# Patient Record
Sex: FEMALE | Race: WHITE | NOT HISPANIC OR LATINO | Employment: FULL TIME | URBAN - METROPOLITAN AREA
[De-identification: names, ages, dates, MRNs, and addresses within clinical notes are randomized per-mention and may not be internally consistent; named-entity substitution may affect disease eponyms.]

---

## 2017-01-18 ENCOUNTER — TRANSCRIBE ORDERS (OUTPATIENT)
Dept: ADMINISTRATIVE | Facility: HOSPITAL | Age: 62
End: 2017-01-18

## 2017-01-18 DIAGNOSIS — Z12.31 VISIT FOR SCREENING MAMMOGRAM: Primary | ICD-10-CM

## 2017-02-23 ENCOUNTER — HOSPITAL ENCOUNTER (OUTPATIENT)
Dept: MAMMOGRAPHY | Facility: HOSPITAL | Age: 62
Discharge: HOME | End: 2017-02-23
Admitting: INTERNAL MEDICINE

## 2017-02-23 DIAGNOSIS — Z12.31 VISIT FOR SCREENING MAMMOGRAM: ICD-10-CM

## 2017-02-23 PROCEDURE — 77063 BREAST TOMOSYNTHESIS BI: CPT

## 2017-02-23 PROCEDURE — G0202 SCR MAMMO BI INCL CAD: HCPCS

## 2017-02-23 PROCEDURE — 77067 SCR MAMMO BI INCL CAD: CPT | Performed by: RADIOLOGY

## 2017-02-23 PROCEDURE — 77063 BREAST TOMOSYNTHESIS BI: CPT | Performed by: RADIOLOGY

## 2017-09-05 ENCOUNTER — HOSPITAL ENCOUNTER (EMERGENCY)
Facility: HOSPITAL | Age: 62
Discharge: HOME | End: 2017-09-05
Attending: EMERGENCY MEDICINE | Admitting: EMERGENCY MEDICINE

## 2017-09-05 ENCOUNTER — APPOINTMENT (OUTPATIENT)
Dept: CT IMAGING | Facility: HOSPITAL | Age: 62
End: 2017-09-05

## 2017-09-05 VITALS
HEIGHT: 64 IN | DIASTOLIC BLOOD PRESSURE: 73 MMHG | RESPIRATION RATE: 18 BRPM | HEART RATE: 80 BPM | OXYGEN SATURATION: 100 % | WEIGHT: 140 LBS | BODY MASS INDEX: 23.9 KG/M2 | SYSTOLIC BLOOD PRESSURE: 139 MMHG | TEMPERATURE: 99.1 F

## 2017-09-05 DIAGNOSIS — E16.2 HYPOGLYCEMIA: ICD-10-CM

## 2017-09-05 DIAGNOSIS — N23 RENAL COLIC: ICD-10-CM

## 2017-09-05 DIAGNOSIS — N20.1 URETEROLITHIASIS: Primary | ICD-10-CM

## 2017-09-05 DIAGNOSIS — I45.10 RIGHT BUNDLE BRANCH BLOCK: ICD-10-CM

## 2017-09-05 DIAGNOSIS — D72.829 LEUKOCYTOSIS, UNSPECIFIED TYPE: ICD-10-CM

## 2017-09-05 DIAGNOSIS — R10.9 FLANK PAIN: ICD-10-CM

## 2017-09-05 LAB
ALBUMIN SERPL-MCNC: 4.1 G/DL (ref 3.2–4.8)
ALBUMIN/GLOB SERPL: 1.3 G/DL (ref 1.5–2.5)
ALP SERPL-CCNC: 93 U/L (ref 25–100)
ALT SERPL W P-5'-P-CCNC: 18 U/L (ref 7–40)
ANION GAP SERPL CALCULATED.3IONS-SCNC: 9 MMOL/L (ref 3–11)
AST SERPL-CCNC: 16 U/L (ref 0–33)
BACTERIA UR QL AUTO: ABNORMAL /HPF
BASOPHILS # BLD AUTO: 0.03 10*3/MM3 (ref 0–0.2)
BASOPHILS NFR BLD AUTO: 0.2 % (ref 0–1)
BILIRUB SERPL-MCNC: 0.4 MG/DL (ref 0.3–1.2)
BILIRUB UR QL STRIP: NEGATIVE
BUN BLD-MCNC: 16 MG/DL (ref 9–23)
BUN/CREAT SERPL: 14.5 (ref 7–25)
CALCIUM SPEC-SCNC: 9.3 MG/DL (ref 8.7–10.4)
CHLORIDE SERPL-SCNC: 104 MMOL/L (ref 99–109)
CLARITY UR: ABNORMAL
CO2 SERPL-SCNC: 26 MMOL/L (ref 20–31)
COLOR UR: YELLOW
CREAT BLD-MCNC: 1.1 MG/DL (ref 0.6–1.3)
D-LACTATE SERPL-SCNC: 2 MMOL/L (ref 0.5–2)
DEPRECATED RDW RBC AUTO: 44.3 FL (ref 37–54)
EOSINOPHIL # BLD AUTO: 0.03 10*3/MM3 (ref 0–0.3)
EOSINOPHIL NFR BLD AUTO: 0.2 % (ref 0–3)
ERYTHROCYTE [DISTWIDTH] IN BLOOD BY AUTOMATED COUNT: 13.4 % (ref 11.3–14.5)
GFR SERPL CREATININE-BSD FRML MDRD: 50 ML/MIN/1.73
GLOBULIN UR ELPH-MCNC: 3.2 GM/DL
GLUCOSE BLD-MCNC: 134 MG/DL (ref 70–100)
GLUCOSE UR STRIP-MCNC: NEGATIVE MG/DL
HCT VFR BLD AUTO: 40.4 % (ref 34.5–44)
HGB BLD-MCNC: 13.2 G/DL (ref 11.5–15.5)
HGB UR QL STRIP.AUTO: ABNORMAL
HOLD SPECIMEN: NORMAL
HOLD SPECIMEN: NORMAL
HYALINE CASTS UR QL AUTO: ABNORMAL /LPF
IMM GRANULOCYTES # BLD: 0.03 10*3/MM3 (ref 0–0.03)
IMM GRANULOCYTES NFR BLD: 0.2 % (ref 0–0.6)
INR PPP: 0.94
KETONES UR QL STRIP: NEGATIVE
LEUKOCYTE ESTERASE UR QL STRIP.AUTO: ABNORMAL
LIPASE SERPL-CCNC: 57 U/L (ref 6–51)
LYMPHOCYTES # BLD AUTO: 1.55 10*3/MM3 (ref 0.6–4.8)
LYMPHOCYTES NFR BLD AUTO: 12.8 % (ref 24–44)
MCH RBC QN AUTO: 29.5 PG (ref 27–31)
MCHC RBC AUTO-ENTMCNC: 32.7 G/DL (ref 32–36)
MCV RBC AUTO: 90.4 FL (ref 80–99)
MONOCYTES # BLD AUTO: 0.53 10*3/MM3 (ref 0–1)
MONOCYTES NFR BLD AUTO: 4.4 % (ref 0–12)
NEUTROPHILS # BLD AUTO: 9.96 10*3/MM3 (ref 1.5–8.3)
NEUTROPHILS NFR BLD AUTO: 82.2 % (ref 41–71)
NITRITE UR QL STRIP: NEGATIVE
PH UR STRIP.AUTO: <=5 [PH] (ref 5–8)
PLATELET # BLD AUTO: 260 10*3/MM3 (ref 150–450)
PMV BLD AUTO: 8.2 FL (ref 6–12)
POTASSIUM BLD-SCNC: 3.8 MMOL/L (ref 3.5–5.5)
PROT SERPL-MCNC: 7.3 G/DL (ref 5.7–8.2)
PROT UR QL STRIP: ABNORMAL
PROTHROMBIN TIME: 10.2 SECONDS (ref 9.6–11.5)
RBC # BLD AUTO: 4.47 10*6/MM3 (ref 3.89–5.14)
RBC # UR: ABNORMAL /HPF
REF LAB TEST METHOD: ABNORMAL
SODIUM BLD-SCNC: 139 MMOL/L (ref 132–146)
SP GR UR STRIP: 1.02 (ref 1–1.03)
SQUAMOUS #/AREA URNS HPF: ABNORMAL /HPF
TROPONIN I SERPL-MCNC: 0 NG/ML (ref 0–0.07)
UROBILINOGEN UR QL STRIP: ABNORMAL
WBC NRBC COR # BLD: 12.13 10*3/MM3 (ref 3.5–10.8)
WBC UR QL AUTO: ABNORMAL /HPF
WHOLE BLOOD HOLD SPECIMEN: NORMAL
WHOLE BLOOD HOLD SPECIMEN: NORMAL
YEAST URNS QL MICRO: ABNORMAL /HPF

## 2017-09-05 PROCEDURE — 96361 HYDRATE IV INFUSION ADD-ON: CPT

## 2017-09-05 PROCEDURE — 83605 ASSAY OF LACTIC ACID: CPT | Performed by: EMERGENCY MEDICINE

## 2017-09-05 PROCEDURE — 84484 ASSAY OF TROPONIN QUANT: CPT

## 2017-09-05 PROCEDURE — 25010000002 KETOROLAC TROMETHAMINE PER 15 MG: Performed by: EMERGENCY MEDICINE

## 2017-09-05 PROCEDURE — 85610 PROTHROMBIN TIME: CPT | Performed by: EMERGENCY MEDICINE

## 2017-09-05 PROCEDURE — 96376 TX/PRO/DX INJ SAME DRUG ADON: CPT

## 2017-09-05 PROCEDURE — 81001 URINALYSIS AUTO W/SCOPE: CPT | Performed by: EMERGENCY MEDICINE

## 2017-09-05 PROCEDURE — 83690 ASSAY OF LIPASE: CPT | Performed by: EMERGENCY MEDICINE

## 2017-09-05 PROCEDURE — 80053 COMPREHEN METABOLIC PANEL: CPT | Performed by: EMERGENCY MEDICINE

## 2017-09-05 PROCEDURE — 74176 CT ABD & PELVIS W/O CONTRAST: CPT

## 2017-09-05 PROCEDURE — 96365 THER/PROPH/DIAG IV INF INIT: CPT

## 2017-09-05 PROCEDURE — 25010000002 CEFTRIAXONE PER 250 MG: Performed by: EMERGENCY MEDICINE

## 2017-09-05 PROCEDURE — 96375 TX/PRO/DX INJ NEW DRUG ADDON: CPT

## 2017-09-05 PROCEDURE — 93005 ELECTROCARDIOGRAM TRACING: CPT | Performed by: EMERGENCY MEDICINE

## 2017-09-05 PROCEDURE — 99284 EMERGENCY DEPT VISIT MOD MDM: CPT

## 2017-09-05 PROCEDURE — 85025 COMPLETE CBC W/AUTO DIFF WBC: CPT | Performed by: EMERGENCY MEDICINE

## 2017-09-05 PROCEDURE — 87086 URINE CULTURE/COLONY COUNT: CPT | Performed by: EMERGENCY MEDICINE

## 2017-09-05 RX ORDER — SODIUM CHLORIDE 0.9 % (FLUSH) 0.9 %
10 SYRINGE (ML) INJECTION AS NEEDED
Status: DISCONTINUED | OUTPATIENT
Start: 2017-09-05 | End: 2017-09-05 | Stop reason: HOSPADM

## 2017-09-05 RX ORDER — HYDROMORPHONE HYDROCHLORIDE 1 MG/ML
0.5 INJECTION, SOLUTION INTRAMUSCULAR; INTRAVENOUS; SUBCUTANEOUS ONCE
Status: DISCONTINUED | OUTPATIENT
Start: 2017-09-05 | End: 2017-09-05 | Stop reason: HOSPADM

## 2017-09-05 RX ORDER — KETOROLAC TROMETHAMINE 15 MG/ML
10 INJECTION, SOLUTION INTRAMUSCULAR; INTRAVENOUS ONCE
Status: COMPLETED | OUTPATIENT
Start: 2017-09-05 | End: 2017-09-05

## 2017-09-05 RX ORDER — SODIUM CHLORIDE 9 MG/ML
500 INJECTION, SOLUTION INTRAVENOUS ONCE
Status: COMPLETED | OUTPATIENT
Start: 2017-09-05 | End: 2017-09-05

## 2017-09-05 RX ORDER — ONDANSETRON 8 MG/1
8 TABLET, ORALLY DISINTEGRATING ORAL EVERY 8 HOURS PRN
Qty: 10 TABLET | Refills: 0 | Status: SHIPPED | OUTPATIENT
Start: 2017-09-05 | End: 2017-09-29

## 2017-09-05 RX ORDER — TAMSULOSIN HYDROCHLORIDE 0.4 MG/1
1 CAPSULE ORAL NIGHTLY
Qty: 15 CAPSULE | Refills: 0 | Status: SHIPPED | OUTPATIENT
Start: 2017-09-05 | End: 2017-10-26

## 2017-09-05 RX ORDER — KETOROLAC TROMETHAMINE 15 MG/ML
5 INJECTION, SOLUTION INTRAMUSCULAR; INTRAVENOUS ONCE
Status: COMPLETED | OUTPATIENT
Start: 2017-09-05 | End: 2017-09-05

## 2017-09-05 RX ORDER — SODIUM CHLORIDE 9 MG/ML
125 INJECTION, SOLUTION INTRAVENOUS CONTINUOUS
Status: DISCONTINUED | OUTPATIENT
Start: 2017-09-05 | End: 2017-09-05 | Stop reason: HOSPADM

## 2017-09-05 RX ORDER — ONDANSETRON 2 MG/ML
4 INJECTION INTRAMUSCULAR; INTRAVENOUS ONCE
Status: DISCONTINUED | OUTPATIENT
Start: 2017-09-05 | End: 2017-09-05 | Stop reason: HOSPADM

## 2017-09-05 RX ORDER — CEFDINIR 300 MG/1
300 CAPSULE ORAL 2 TIMES DAILY
Qty: 20 CAPSULE | Refills: 0 | Status: SHIPPED | OUTPATIENT
Start: 2017-09-05 | End: 2017-09-29

## 2017-09-05 RX ORDER — OXYCODONE HYDROCHLORIDE AND ACETAMINOPHEN 5; 325 MG/1; MG/1
1-2 TABLET ORAL EVERY 6 HOURS PRN
Qty: 24 TABLET | Refills: 0 | Status: SHIPPED | OUTPATIENT
Start: 2017-09-05 | End: 2017-09-29

## 2017-09-05 RX ORDER — CEFTRIAXONE SODIUM 1 G/50ML
1 INJECTION, SOLUTION INTRAVENOUS ONCE
Status: COMPLETED | OUTPATIENT
Start: 2017-09-05 | End: 2017-09-05

## 2017-09-05 RX ADMIN — SODIUM CHLORIDE 125 ML/HR: 9 INJECTION, SOLUTION INTRAVENOUS at 08:27

## 2017-09-05 RX ADMIN — SODIUM CHLORIDE 500 ML: 9 INJECTION, SOLUTION INTRAVENOUS at 06:48

## 2017-09-05 RX ADMIN — CEFTRIAXONE SODIUM 1 G: 1 INJECTION, SOLUTION INTRAVENOUS at 08:30

## 2017-09-05 RX ADMIN — KETOROLAC TROMETHAMINE 5 MG: 15 INJECTION, SOLUTION INTRAMUSCULAR; INTRAVENOUS at 09:48

## 2017-09-05 RX ADMIN — KETOROLAC TROMETHAMINE 10 MG: 15 INJECTION, SOLUTION INTRAMUSCULAR; INTRAVENOUS at 08:17

## 2017-09-05 NOTE — ED PROVIDER NOTES
Subjective   HPI Comments: 62-year-old white female presents to the emergency Department with left flank pain    Patient reports onset of left flank pain 2 days ago.  She's had hematuria more probably during the day and straw colored urine at night associated with left flank pain.  She feels better when she walks.  Today it started radiating around toward the front at 3 AM with increased pain graded 9/10.  She denies any fevers chills nausea vomiting diarrhea.  She has no BRBPR.  She denies any cough congestion or URI symptoms.  She presents to the ED only after acceleration of her pain this morning at 3 AM    She denies any history of kidney stone.  She denies any other chronic medical problems and is on no medications.    Patient is a 62 y.o. female presenting with flank pain.   Flank Pain   Pain location:  L flank  Pain quality: aching    Pain radiates to:  LLQ  Onset quality:  Gradual  Duration:  2 days  Timing:  Constant  Progression:  Waxing and waning  Chronicity:  New  Context: not alcohol use, not laxative use, not medication withdrawal, not previous surgeries, not recent illness, not sick contacts, not suspicious food intake and not trauma    Relieved by:  Nothing  Worsened by:  Nothing  Ineffective treatments:  Position changes and urination  Associated symptoms: hematuria    Associated symptoms: no chest pain, no chills, no constipation, no cough, no diarrhea, no dysuria, no fever, no hematemesis, no hematochezia, no nausea, no shortness of breath, no sore throat and no vomiting    Risk factors: no alcohol abuse, not elderly and not pregnant        Review of Systems   Constitutional: Negative.  Negative for chills and fever.   HENT: Negative.  Negative for rhinorrhea and sore throat.    Eyes: Negative.    Respiratory: Negative.  Negative for cough and shortness of breath.    Cardiovascular: Negative.  Negative for chest pain.   Gastrointestinal: Negative.  Negative for blood in stool, constipation,  diarrhea, hematemesis, hematochezia, nausea and vomiting.   Genitourinary: Positive for flank pain and hematuria. Negative for dysuria, frequency and urgency.   Skin: Negative.  Negative for rash.   Neurological: Negative.    All other systems reviewed and are negative.      History reviewed. No pertinent past medical history.    No Known Allergies    History reviewed. No pertinent surgical history.    History reviewed. No pertinent family history.    Social History     Social History   • Marital status:      Spouse name: N/A   • Number of children: N/A   • Years of education: N/A     Social History Main Topics   • Smoking status: Never Smoker   • Smokeless tobacco: None   • Alcohol use No   • Drug use: No   • Sexual activity: Not Asked     Other Topics Concern   • None     Social History Narrative   • None           Objective   Physical Exam   Constitutional: She is oriented to person, place, and time. She appears well-developed and well-nourished. No distress.   HENT:   Head: Normocephalic and atraumatic.   Nose: Nose normal.   Mouth/Throat: Oropharynx is clear and moist.   Eyes: Conjunctivae are normal.   Neck: Normal range of motion. Neck supple.   Cardiovascular: Normal rate, regular rhythm, normal heart sounds and intact distal pulses.    Pulmonary/Chest: Effort normal and breath sounds normal. No respiratory distress. She has no wheezes. She has no rales. She exhibits no tenderness.   Abdominal: Soft. Bowel sounds are normal. She exhibits no distension and no mass. There is no tenderness. There is no rebound and no guarding.   Musculoskeletal: Normal range of motion. She exhibits no edema.   Lymphadenopathy:     She has no cervical adenopathy.   Neurological: She is alert and oriented to person, place, and time.   Skin: Skin is warm and dry. No rash noted. She is not diaphoretic.   Psychiatric: She has a normal mood and affect. Her behavior is normal.   Nursing note and vitals  reviewed.      Procedures         ED Course  ED Course   Comment By Time   Discussed findings with patient and spouse.  Discussed medications.  Patient has no medication allergies.  I discussed analgesics ordered and available for her she would choose to take.  I've ordered her Zofran. Celio Bonilla MD 09/05 0644   Dr. Bonilla spoke with the pt and discussed all findings. The pt does not have a urologist. Levar Rouse 09/05 0747   I discussed urologic follow-up with patient and spouse.  They would like to follow-up with Dr. Luo. Celio Bonilla MD 09/05 0820   [facility] query complete. Treatment plan to include limited course of prescribed  controlled substance. Risks including addiction, benefits, and alternatives presented to patient. Levar Rouse 09/05 0826   Dr. Bonilla spoke with Dr. Luo. He agrees with the current course of care and will see the pt tomorrow. Levar Rouse 09/05 0838   I discussed discharge instructions at length with this very medically obtained patient and spouse.  I discussed follow-up and indications for immediate return.Provided him a copy of EKG as this appears to be a new right bundle branch block but without any symptoms or history of cardiac disease.Very pleasant and responsible patient and spouse verbalized understanding agreement with the plan of care. Celio Bonilla MD 09/05 0847                  [facility]    Final diagnoses:   Ureterolithiasis   Renal colic   Flank pain   Right bundle branch block - requiring follow up   Leukocytosis, unspecified type - new   Hypoglycemia            Celio Bonilla MD  09/05/17 0393

## 2017-09-05 NOTE — DISCHARGE INSTRUCTIONS
Strain your urine, and if you pass your stone take it to the urologist for evaluation.    Push plenty of fluids.    Take pain medications, as prescribed.    Take antibiotics as ordered.    Follow up with Dr. Luo tomorrow, as discussed.    Follow up with Dr. Cao for follow up on right bundle branch block on EKG. Discuss if a cardiovascular referral is required.    Caution with using the Flomax as it may give you orthostatic hypotension, and cause dizziness with standing.  Sit on the side of the bed after lying down, and immediately lay down or sit down if you become dizzy.  Stop taking the Cipro drops her blood pressure.     If you develop intractable fever or pain or other acute, progressive, emergent or urgent symptoms, as discussed, return to the emergency room for evaluation.    CONTROLLED SUBSTANCE(S) EDUCATION  Controlled Substances have been prescribed by your provider to treat your medical condition and associated symptoms. Although Controlled Substances can be effective in relieving your pain or other symptoms, they may also cause serious adverse effects. It is important that you understand how to safely and appropriately take these medications.  Proper Use  1. Carefully following instructions for use, including timing of doses, whether to take the  medication with or without food, and any foods or other medications to avoid while taking the medication;  2. If you have low or impaired vision you should wear glasses when taking the medication and not take the medication in the dark;  3. You should read the prescription container label each time to confirm the dosage;  4. You should never use the medication after the expiration date;  5. You must never share the medication with others;  6. You must not take the medication with alcohol or other sedatives;  7. You should not take the medication to help you sleep;  8. You should never break, crush or chew the medication;  9. If you have been prescribed a  skin patch (transdermal), external heat, fever and exertion can increase the absorption of these products, leading to potentially fatal overdose;  10. You should immediately contact the physician?s office to report any adverse reaction and,  11. It is illegal to share, sell or give away Controlled Substances.  Driving and Work Safety  1. Controlled Substances may cause sleepiness, clouded thinking, decreased concentration, slower reflexes, or incoordination, all of which may create a danger to you and others when driving or operating certain type of machinery;  2. Avoid, if possible, driving or engaging in other potentially dangerous work or other activities, for a specific period of time until the initial effects of the Controlled Substances no longer create such dangers; and,  3. Ingesting other substances, such as alcohol, benzodiazepines or some cold remedies, at the same time you are taking the Controlled Substances prescribed or dispensed may increase cognitive and motor impairment.  Pregnancy  If you are pregnant or nursing a baby, avoid using Controlled Substances, or use them on a minimal basis in strict accordance with your provider?s instructions.  Potential for Overdose and Response  1. The use of Controlled Substances creates a risk of respiratory depression, which may result in serious harm or death. You and others should be watchful for the following warning signs of overmedication:  ? intoxicated behavior, such as confusion, slurred speech, or stumbling;  ? feeling dizzy or faint;  ? acting very drowsy or groggy;  ? unusual snoring, gasping, or snorting during sleep;  ? and/or difficulty waking up from sleep or difficulty in staying awake.  2. Immediately call ?911? or an emergency service upon you or your caregivers observing or experiencing any of the following conditions:  ? you cannot be aroused or waken, or are unable to talk after being awakened;  ? you have shortness of breath, slow or light  breathing, or stopped breathing;  ? gurgling noises coming from your mouth or throat;  ? your body is limp, seems lifeless;  ? your face is pale or clammy;  ? your fingernails or lips are turning purple or blue; and/or  ? your heartbeat is slow, unusual or stopped  Safe Storage of Controlled Substances  1. If your Controlled Substances are not stored in a safe manner there is a potential that  partners, family members or others may improperly obtain your Controlled Substances;  2. Always keep the Controlled Substances in the original container;  3. Store Controlled Substances in a locked cabinet or other secure storage unit, that is cool, dry and out of direct sunlight, such as:  ? an existing safe;  ? a cut-proof travel bag;  ? a portable lock box designed for travel; or,  ? a locking medical box.  4. Do not store Controlled Substances in:  ? an unlocked medicine cabinet;  ? in your car; or,  ? in a refrigerator or freezer unless specifically recommended by the prescriber or  pharmacist; and  5. Immediately notify your provider if any Controlled Substances prescribed or dispensed by the provider are stolen or improperly taken by another individual.  Proper Disposal  1. It is important to safely and appropriately dispose of unused Controlled Substances that had been prescribed or dispensed by your provider;  2. Promptly dispose of unused Controlled Substances after the expiration date of the  prescription or after you no longer require the Controlled Substances to treat your medical condition;  3. In order to safely dispose of Controlled Substances, you should turn in the unused Controlled Substances as part of an approved governmental drug take-back program. The [place] Office of Drug Control Policy has a listing of [place] Permanent Drug Disposal Locations at http://www.odcp.[place].gov - click on the [place] Prescriptions Drug Drop Map and Location on the left side of the page.  4. You should not flush Controlled  Substances down the toilet; and,  5. You should personally remove any identifying information, including the prescription number, from an empty Controlled Substance container and then properly dispose of the empty container.  CONSENT FOR TREATMENT WITH CONTROLLED SUBSTANCE(S)  (This is for an initial prescription)  1. Controlled Substances  Controlled Substances are prescribed to treat a variety of conditions, including the relief  of chronic pain, to provide stimulation, promote weight loss, and treat mood disorders.  Pain relief is an important medical reason to take Controlled Substances.  Controlled Substances are drugs or chemical substances whose possession and use are  regulated under the Controlled Substances Act. The law requires that patient are informed of the risks, benefits, and alternatives of taking Controlled Substances.  2. Adverse Effects  As with any medication, there are risks and adverse effects associated with the use of  Controlled Substances. Common adverse effects of pain medicines could include, but are not limited to: sedation or sleepiness, nausea, vomiting, constipation, pruritus (itching), confusion, respiratory depression, and urinary retention. Some of these effects may make it unsafe for you to drive a vehicle, operate heavy machinery, or perform other tasks that require concentration and coordination. Excessive use of these Controlled Substances can lead to profound sedation, respiratory depression, coma, and/or death. Regarding stimulants, adverse effects could include, but are not limited to: drug dependency, neuropsychiatric symptoms such as psychosis and garrick, weight loss, cardiovascular events such as heart attack and stroke, insomnia, hypertension, and agitation. Any questions you have regarding the Controlled Substance(s) should be discussed with the prescribing provider.  3. Physical Dependence, Tolerance, and Addiction  Although uncommon when used for their clinical  indications, both pain relievers and  stimulants can cause physical dependence, tolerance, and/or addiction when used for a  prolonged period. Maintenance therapy with these Controlled Substances can cause  physical dependence. This means that if these medications are abruptly stopped, or  decreased significantly over a short period of time, a patient may experience withdrawal  symptoms such as: nervousness, irritability, insomnia, sweating, abdominal cramping,  nausea, vomiting, and diarrhea. Tolerance occurs when the effects of these Controlled  Substances are decreased over a period of prolonged use making it necessary to increase the dosage. Physical dependence and tolerance are different than addiction. Addiction is a complex disease characterized by compulsive craving or seeking and use of a substance despite its extreme negatives on a person. The risk of addiction may be increased in a patient with a history of alcoholism or other addiction.  4. Alternatives  Controlled Substances are routinely prescribed to treat moderate to severe pain or other  medical conditions. Other medicines are available to treat these conditions that are not  associated with tolerance or addiction, however, are associated with a lower level of pain  relief or stimulation. It may also be an alternative to not take any medicine to treat these  conditions, or to use alternative modalities, other than medicine to treat these conditions.  I voluntarily consent to the receipt of the above-named Controlled Substance(s) as prescribed by my provider. I have been informed of the benefits, risks, and alternatives to taking these medications. I acknowledge that I have read and understood all of the information above and I have had the opportunity to ask questions and have them answered to my satisfaction.

## 2017-09-07 LAB — BACTERIA SPEC AEROBE CULT: NORMAL

## 2017-09-29 ENCOUNTER — OFFICE VISIT (OUTPATIENT)
Dept: FAMILY MEDICINE CLINIC | Facility: CLINIC | Age: 62
End: 2017-09-29

## 2017-09-29 VITALS
HEART RATE: 86 BPM | BODY MASS INDEX: 27.29 KG/M2 | SYSTOLIC BLOOD PRESSURE: 140 MMHG | WEIGHT: 159 LBS | OXYGEN SATURATION: 96 % | DIASTOLIC BLOOD PRESSURE: 82 MMHG

## 2017-09-29 DIAGNOSIS — I45.10 RBBB: Primary | ICD-10-CM

## 2017-09-29 PROCEDURE — 99212 OFFICE O/P EST SF 10 MIN: CPT | Performed by: INTERNAL MEDICINE

## 2017-09-29 NOTE — PROGRESS NOTES
Subjective   Coleen Kohler is a 62 y.o. female.     History of Present Illness    The patient comes in after being seen in the emergency room for kidney stones.  She is now free of pain from the kidney stone.  An incidental finding during her visit at the emergency room was a right bundle branch block.  It is unknown how old this is.  She has no risk factors for heart disease and denies chest pain.  She has no shortness of breath, orthopnea, PND, or pedal edema    The following portions of the patient's history were reviewed and updated as appropriate: allergies, current medications, past family history, past medical history, past social history, past surgical history and problem list.    Review of Systems   Constitutional: Positive for fatigue. Negative for fever.   HENT: Negative for congestion and trouble swallowing.    Respiratory: Positive for chest tightness. Negative for cough, choking and shortness of breath.    Cardiovascular: Negative for chest pain, palpitations and leg swelling.   Gastrointestinal: Negative for abdominal distention, abdominal pain and nausea.   Musculoskeletal: Negative for back pain.   Neurological: Negative for syncope.   Psychiatric/Behavioral: Negative for dysphoric mood. The patient is not nervous/anxious.        Objective   Physical Exam   Constitutional: She is oriented to person, place, and time. She appears well-developed and well-nourished. No distress.   Neck: No JVD present.   Cardiovascular: Normal rate, regular rhythm, normal heart sounds and intact distal pulses.  Exam reveals no gallop and no friction rub.    No murmur heard.  Pulmonary/Chest: Effort normal. No respiratory distress. She has no wheezes. She has no rales. She exhibits no tenderness.   Neurological: She is alert and oriented to person, place, and time.   Skin: Skin is warm and dry. She is not diaphoretic.   Psychiatric: She has a normal mood and affect. Her behavior is normal.   Nursing note and vitals  reviewed.      Assessment/Plan   Coleen was seen today for  er kidney stone.    Diagnoses and all orders for this visit:    RBBB  -     Ambulatory Referral to Cardiology

## 2017-10-26 ENCOUNTER — OFFICE VISIT (OUTPATIENT)
Dept: CARDIOLOGY | Facility: CLINIC | Age: 62
End: 2017-10-26

## 2017-10-26 VITALS
HEART RATE: 76 BPM | BODY MASS INDEX: 26.33 KG/M2 | DIASTOLIC BLOOD PRESSURE: 74 MMHG | WEIGHT: 158 LBS | SYSTOLIC BLOOD PRESSURE: 122 MMHG | HEIGHT: 65 IN

## 2017-10-26 DIAGNOSIS — I45.10 RIGHT BUNDLE BRANCH BLOCK: Primary | ICD-10-CM

## 2017-10-26 PROBLEM — Z87.442 HISTORY OF NEPHROLITHIASIS: Status: ACTIVE | Noted: 2017-10-26

## 2017-10-26 PROCEDURE — 99203 OFFICE O/P NEW LOW 30 MIN: CPT | Performed by: INTERNAL MEDICINE

## 2017-10-26 NOTE — PROGRESS NOTES
"  Subjective:     Encounter Date:10/26/2017      Patient ID: Coleen Kohler is a 62 y.o. female.    Reason for consultation: RBBB    Problem List:  Problem   Right Bundle Branch Block   History of Nephrolithiasis       No Known Allergies    No current outpatient prescriptions on file.    HPI:  Ms. Kohler presents today as a consult referral from Dr. Shamar Cao for right bundle branch block. Patient was admitted to the ED for left flank pain caused by nephrolithiasis. It was then incidentally found on an EKG that she had a right bundle branch block. She states that she has been feeling well overall from a cardiovascular standpoint. She denies chest pain. She denies any further flank pain since being discharged from the ED. She states that her breathing has been doing well. Patient admittedly is not doing much in terms of exercise. She is remaining busy by working throughout the week and watching after her grandchildren on the weekends.      Cardiac Risk Factors: RBBB    Social History: No tobacco use. No drug use. No alcohol use. She drinks one caffeinated beverage per day.     Family history: Father has hypertension, otherwise no pertinent family history of cardiac disease.    Review of Systems:  The following portions of the patient's history were reviewed and updated as appropriate: allergies, current medications and problem list.    The above systems were reviewed and pertinent positives were noted.    Objective:        Blood pressure 122/74, pulse 76, height 65\" (165.1 cm), weight 158 lb (71.7 kg).    Physical Exam:  General: Alert and oriented  HEENT: Pupils equal round and reactive to light.  Oropharynx is clear and moist.    Neck: Jugular venous pressure is within normal limits. Carotids have normal upstrokes without bruits.  No thyromegaly.  No lymphadenopathy.  Cardiovascular: Regular rate and rhythm without murmur gallop or rub. PMI is nondisplaced.  Lungs: Clear without rales or wheezes. Equal expansion is " noted.   Abdomen: Soft and nontender with normal bowel sounds throughout.  No hepatosplenomegaly or midline bruits are heard.  Extremities: Show no edema.  1+ pedal pulses are noted.  Skin: warm and dry.  Musculoskeletal: No gross joint deformities are noted.  Neurologic: nonfocal      Admission on 09/05/2017, Discharged on 09/05/2017   Component Date Value Ref Range Status   • Glucose 09/05/2017 134* 70 - 100 mg/dL Final   • BUN 09/05/2017 16  9 - 23 mg/dL Final   • Creatinine 09/05/2017 1.10  0.60 - 1.30 mg/dL Final   • Sodium 09/05/2017 139  132 - 146 mmol/L Final   • Potassium 09/05/2017 3.8  3.5 - 5.5 mmol/L Final   • Chloride 09/05/2017 104  99 - 109 mmol/L Final   • CO2 09/05/2017 26.0  20.0 - 31.0 mmol/L Final   • Calcium 09/05/2017 9.3  8.7 - 10.4 mg/dL Final   • Total Protein 09/05/2017 7.3  5.7 - 8.2 g/dL Final   • Albumin 09/05/2017 4.10  3.20 - 4.80 g/dL Final   • ALT (SGPT) 09/05/2017 18  7 - 40 U/L Final   • AST (SGOT) 09/05/2017 16  0 - 33 U/L Final   • Alkaline Phosphatase 09/05/2017 93  25 - 100 U/L Final   • Total Bilirubin 09/05/2017 0.4  0.3 - 1.2 mg/dL Final   • eGFR Non African Amer 09/05/2017 50* >60 mL/min/1.73 Final   • Globulin 09/05/2017 3.2  gm/dL Final   • A/G Ratio 09/05/2017 1.3* 1.5 - 2.5 g/dL Final   • BUN/Creatinine Ratio 09/05/2017 14.5  7.0 - 25.0 Final   • Anion Gap 09/05/2017 9.0  3.0 - 11.0 mmol/L Final   • Lipase 09/05/2017 57* 6 - 51 U/L Final   • Color, UA 09/05/2017 Yellow  Yellow, Straw Final   • Appearance, UA 09/05/2017 Cloudy* Clear Final   • pH, UA 09/05/2017 <=5.0  5.0 - 8.0 Final   • Specific Gravity, UA 09/05/2017 1.023  1.001 - 1.030 Final   • Glucose, UA 09/05/2017 Negative  Negative Final   • Ketones, UA 09/05/2017 Negative  Negative Final   • Bilirubin, UA 09/05/2017 Negative  Negative Final   • Blood, UA 09/05/2017 Large (3+)* Negative Final   • Protein, UA 09/05/2017 30 mg/dL (1+)* Negative Final   • Leuk Esterase, UA 09/05/2017 Large (3+)* Negative Final   •  Nitrite, UA 09/05/2017 Negative  Negative Final   • Urobilinogen, UA 09/05/2017 0.2 E.U./dL  0.2 - 1.0 E.U./dL Final   • Lactate 09/05/2017 2.0  0.5 - 2.0 mmol/L Final    Falsely depressed results may occur on samples drawn from patients receiving N-Acetylcysteine (NAC) or Metamizole.   • Protime 09/05/2017 10.2  9.6 - 11.5 Seconds Final   • INR 09/05/2017 0.94   Final   • Extra Tube 09/05/2017 hold for add-on   Final    Auto resulted   • Extra Tube 09/05/2017 Hold for add-ons.   Final    Auto resulted.   • Extra Tube 09/05/2017 hold for add-on   Final    Auto resulted   • Extra Tube 09/05/2017 Hold for add-ons.   Final    Auto resulted.   • WBC 09/05/2017 12.13* 3.50 - 10.80 10*3/mm3 Final   • RBC 09/05/2017 4.47  3.89 - 5.14 10*6/mm3 Final   • Hemoglobin 09/05/2017 13.2  11.5 - 15.5 g/dL Final   • Hematocrit 09/05/2017 40.4  34.5 - 44.0 % Final   • MCV 09/05/2017 90.4  80.0 - 99.0 fL Final   • MCH 09/05/2017 29.5  27.0 - 31.0 pg Final   • MCHC 09/05/2017 32.7  32.0 - 36.0 g/dL Final   • RDW 09/05/2017 13.4  11.3 - 14.5 % Final   • RDW-SD 09/05/2017 44.3  37.0 - 54.0 fl Final   • MPV 09/05/2017 8.2  6.0 - 12.0 fL Final   • Platelets 09/05/2017 260  150 - 450 10*3/mm3 Final   • Neutrophil % 09/05/2017 82.2* 41.0 - 71.0 % Final   • Lymphocyte % 09/05/2017 12.8* 24.0 - 44.0 % Final   • Monocyte % 09/05/2017 4.4  0.0 - 12.0 % Final   • Eosinophil % 09/05/2017 0.2  0.0 - 3.0 % Final   • Basophil % 09/05/2017 0.2  0.0 - 1.0 % Final   • Immature Grans % 09/05/2017 0.2  0.0 - 0.6 % Final   • Neutrophils, Absolute 09/05/2017 9.96* 1.50 - 8.30 10*3/mm3 Final   • Lymphocytes, Absolute 09/05/2017 1.55  0.60 - 4.80 10*3/mm3 Final   • Monocytes, Absolute 09/05/2017 0.53  0.00 - 1.00 10*3/mm3 Final   • Eosinophils, Absolute 09/05/2017 0.03  0.00 - 0.30 10*3/mm3 Final   • Basophils, Absolute 09/05/2017 0.03  0.00 - 0.20 10*3/mm3 Final   • Immature Grans, Absolute 09/05/2017 0.03  0.00 - 0.03 10*3/mm3 Final   • Troponin I  09/05/2017 0.00  0.00 - 0.07 ng/mL Final    Serial Number: [id number]:  [id number]   • RBC, UA 09/05/2017 Too Numerous to Count* None Seen, 0-2 /HPF Final   • WBC, UA 09/05/2017 Too Numerous to Count* None Seen /HPF Final   • Bacteria, UA 09/05/2017 None Seen  None Seen, Trace /HPF Final   • Squamous Epithelial Cells, UA 09/05/2017 3-6* None Seen, 0-2 /HPF Final   • Yeast, UA 09/05/2017 Small/1+ Yeast  None Seen /HPF Final   • Hyaline Casts, UA 09/05/2017 None Seen  0 - 6 /LPF Final   • Methodology 09/05/2017 Manual Light Microscopy   Final   • Urine Culture 09/05/2017 50,000-60,000 CFU/mL Normal Urogenital Cathie   Final     Diagnostic Data:      Lab Results   Component Value Date    WBC 12.13 (H) 09/05/2017    HGB 13.2 09/05/2017    HCT 40.4 09/05/2017    MCV 90.4 09/05/2017     09/05/2017     INR   Date/Time Value Ref Range Status   09/05/2017 06:44 AM 0.94  Final             Procedures    Assessment:       ICD-10-CM ICD-9-CM   1. Right bundle branch block I45.10 426.4          Plan:   1. Follow up as needed.  2. Encouraged patient to begin routine exercise regimen; 30 minutes per day, 4-5 days per week.  3. Recommend patient get lipid panel done with Dr. Cao at next follow up.    Scribed for Maria Eugenia Milan MD by Roshan Breaux. 10/26/2017  11:03 AM    I Maria Eugenia Milan MD personally performed the services described in this documentation as scribed by the above individual in my presence, and it is both accurate and complete.    Maria Eugenia Milan MD, FACC

## 2018-01-22 ENCOUNTER — TRANSCRIBE ORDERS (OUTPATIENT)
Dept: FAMILY MEDICINE CLINIC | Facility: CLINIC | Age: 63
End: 2018-01-22

## 2018-01-22 ENCOUNTER — TRANSCRIBE ORDERS (OUTPATIENT)
Dept: ADMINISTRATIVE | Facility: HOSPITAL | Age: 63
End: 2018-01-22

## 2018-01-22 DIAGNOSIS — Z12.31 VISIT FOR SCREENING MAMMOGRAM: Primary | ICD-10-CM

## 2018-03-01 ENCOUNTER — HOSPITAL ENCOUNTER (OUTPATIENT)
Dept: MAMMOGRAPHY | Facility: HOSPITAL | Age: 63
Discharge: HOME | End: 2018-03-01
Admitting: INTERNAL MEDICINE

## 2018-03-01 DIAGNOSIS — Z12.31 VISIT FOR SCREENING MAMMOGRAM: ICD-10-CM

## 2018-03-01 PROCEDURE — 77063 BREAST TOMOSYNTHESIS BI: CPT

## 2018-03-01 PROCEDURE — 77067 SCR MAMMO BI INCL CAD: CPT | Performed by: RADIOLOGY

## 2018-03-01 PROCEDURE — 77067 SCR MAMMO BI INCL CAD: CPT

## 2018-03-01 PROCEDURE — 77063 BREAST TOMOSYNTHESIS BI: CPT | Performed by: RADIOLOGY

## 2019-01-25 ENCOUNTER — TRANSCRIBE ORDERS (OUTPATIENT)
Dept: ADMINISTRATIVE | Facility: HOSPITAL | Age: 64
End: 2019-01-25

## 2019-01-25 DIAGNOSIS — Z12.31 VISIT FOR SCREENING MAMMOGRAM: Primary | ICD-10-CM

## 2019-03-15 ENCOUNTER — HOSPITAL ENCOUNTER (OUTPATIENT)
Dept: MAMMOGRAPHY | Facility: HOSPITAL | Age: 64
Discharge: HOME | End: 2019-03-15
Attending: INTERNAL MEDICINE | Admitting: INTERNAL MEDICINE

## 2019-03-15 DIAGNOSIS — Z12.31 VISIT FOR SCREENING MAMMOGRAM: ICD-10-CM

## 2019-03-15 PROCEDURE — 77063 BREAST TOMOSYNTHESIS BI: CPT | Performed by: RADIOLOGY

## 2019-03-15 PROCEDURE — 77067 SCR MAMMO BI INCL CAD: CPT | Performed by: RADIOLOGY

## 2019-03-15 PROCEDURE — 77067 SCR MAMMO BI INCL CAD: CPT

## 2019-03-15 PROCEDURE — 77063 BREAST TOMOSYNTHESIS BI: CPT

## 2020-02-07 ENCOUNTER — TRANSCRIBE ORDERS (OUTPATIENT)
Dept: ADMINISTRATIVE | Facility: HOSPITAL | Age: 65
End: 2020-02-07

## 2020-02-07 DIAGNOSIS — Z12.31 VISIT FOR SCREENING MAMMOGRAM: Primary | ICD-10-CM

## 2020-04-17 ENCOUNTER — APPOINTMENT (OUTPATIENT)
Dept: MAMMOGRAPHY | Facility: HOSPITAL | Age: 65
End: 2020-04-17

## 2020-05-26 ENCOUNTER — APPOINTMENT (OUTPATIENT)
Dept: GENERAL RADIOLOGY | Facility: HOSPITAL | Age: 65
End: 2020-05-26

## 2020-05-26 ENCOUNTER — HOSPITAL ENCOUNTER (OUTPATIENT)
Facility: HOSPITAL | Age: 65
Setting detail: OBSERVATION
Discharge: HOME | End: 2020-05-27
Attending: EMERGENCY MEDICINE | Admitting: INTERNAL MEDICINE

## 2020-05-26 ENCOUNTER — APPOINTMENT (OUTPATIENT)
Dept: MRI IMAGING | Facility: HOSPITAL | Age: 65
End: 2020-05-26

## 2020-05-26 DIAGNOSIS — R68.84 JAW PAIN: ICD-10-CM

## 2020-05-26 DIAGNOSIS — R94.31 ABNORMAL EKG: ICD-10-CM

## 2020-05-26 DIAGNOSIS — R53.1 LEFT-SIDED WEAKNESS: Primary | ICD-10-CM

## 2020-05-26 PROBLEM — R42 DIZZINESS: Status: ACTIVE | Noted: 2020-05-26

## 2020-05-26 PROBLEM — R07.9 CHEST PAIN: Status: ACTIVE | Noted: 2020-05-26

## 2020-05-26 PROBLEM — R07.89 ATYPICAL CHEST PAIN: Status: ACTIVE | Noted: 2020-05-26

## 2020-05-26 LAB
ALBUMIN SERPL-MCNC: 4.2 G/DL (ref 3.5–5.2)
ALBUMIN/GLOB SERPL: 1.4 G/DL
ALP SERPL-CCNC: 71 U/L (ref 39–117)
ALT SERPL W P-5'-P-CCNC: 16 U/L (ref 1–33)
ANION GAP SERPL CALCULATED.3IONS-SCNC: 13 MMOL/L (ref 5–15)
AST SERPL-CCNC: 28 U/L (ref 1–32)
BASOPHILS # BLD AUTO: 0.05 10*3/MM3 (ref 0–0.2)
BASOPHILS NFR BLD AUTO: 0.7 % (ref 0–1.5)
BILIRUB SERPL-MCNC: 0.3 MG/DL (ref 0.2–1.2)
BUN BLD-MCNC: 15 MG/DL (ref 8–23)
BUN/CREAT SERPL: 19.7 (ref 7–25)
CALCIUM SPEC-SCNC: 8.9 MG/DL (ref 8.6–10.5)
CHLORIDE SERPL-SCNC: 105 MMOL/L (ref 98–107)
CO2 SERPL-SCNC: 24 MMOL/L (ref 22–29)
CREAT BLD-MCNC: 0.76 MG/DL (ref 0.57–1)
DEPRECATED RDW RBC AUTO: 45.9 FL (ref 37–54)
EOSINOPHIL # BLD AUTO: 0.11 10*3/MM3 (ref 0–0.4)
EOSINOPHIL NFR BLD AUTO: 1.5 % (ref 0.3–6.2)
ERYTHROCYTE [DISTWIDTH] IN BLOOD BY AUTOMATED COUNT: 13.4 % (ref 12.3–15.4)
GFR SERPL CREATININE-BSD FRML MDRD: 76 ML/MIN/1.73
GLOBULIN UR ELPH-MCNC: 3 GM/DL
GLUCOSE BLD-MCNC: 130 MG/DL (ref 65–99)
HCT VFR BLD AUTO: 42.1 % (ref 34–46.6)
HGB BLD-MCNC: 13.3 G/DL (ref 12–15.9)
HOLD SPECIMEN: NORMAL
HOLD SPECIMEN: NORMAL
IMM GRANULOCYTES # BLD AUTO: 0.01 10*3/MM3 (ref 0–0.05)
IMM GRANULOCYTES NFR BLD AUTO: 0.1 % (ref 0–0.5)
LYMPHOCYTES # BLD AUTO: 2.15 10*3/MM3 (ref 0.7–3.1)
LYMPHOCYTES NFR BLD AUTO: 28.9 % (ref 19.6–45.3)
MAGNESIUM SERPL-MCNC: 2 MG/DL (ref 1.6–2.4)
MCH RBC QN AUTO: 29 PG (ref 26.6–33)
MCHC RBC AUTO-ENTMCNC: 31.6 G/DL (ref 31.5–35.7)
MCV RBC AUTO: 91.7 FL (ref 79–97)
MONOCYTES # BLD AUTO: 0.49 10*3/MM3 (ref 0.1–0.9)
MONOCYTES NFR BLD AUTO: 6.6 % (ref 5–12)
NEUTROPHILS # BLD AUTO: 4.64 10*3/MM3 (ref 1.7–7)
NEUTROPHILS NFR BLD AUTO: 62.2 % (ref 42.7–76)
NRBC BLD AUTO-RTO: 0 /100 WBC (ref 0–0.2)
PLATELET # BLD AUTO: 282 10*3/MM3 (ref 140–450)
PMV BLD AUTO: 8.2 FL (ref 6–12)
POTASSIUM BLD-SCNC: 3.8 MMOL/L (ref 3.5–5.2)
PROT SERPL-MCNC: 7.2 G/DL (ref 6–8.5)
RBC # BLD AUTO: 4.59 10*6/MM3 (ref 3.77–5.28)
SODIUM BLD-SCNC: 142 MMOL/L (ref 136–145)
TROPONIN T SERPL-MCNC: <0.01 NG/ML (ref 0–0.03)
TROPONIN T SERPL-MCNC: <0.01 NG/ML (ref 0–0.03)
WBC NRBC COR # BLD: 7.45 10*3/MM3 (ref 3.4–10.8)
WHOLE BLOOD HOLD SPECIMEN: NORMAL
WHOLE BLOOD HOLD SPECIMEN: NORMAL

## 2020-05-26 PROCEDURE — 93005 ELECTROCARDIOGRAM TRACING: CPT | Performed by: EMERGENCY MEDICINE

## 2020-05-26 PROCEDURE — 93005 ELECTROCARDIOGRAM TRACING: CPT

## 2020-05-26 PROCEDURE — 84484 ASSAY OF TROPONIN QUANT: CPT | Performed by: PHYSICIAN ASSISTANT

## 2020-05-26 PROCEDURE — 71045 X-RAY EXAM CHEST 1 VIEW: CPT

## 2020-05-26 PROCEDURE — 99284 EMERGENCY DEPT VISIT MOD MDM: CPT

## 2020-05-26 PROCEDURE — 96360 HYDRATION IV INFUSION INIT: CPT

## 2020-05-26 PROCEDURE — 70551 MRI BRAIN STEM W/O DYE: CPT

## 2020-05-26 PROCEDURE — 99220 PR INITIAL OBSERVATION CARE/DAY 70 MINUTES: CPT | Performed by: PHYSICIAN ASSISTANT

## 2020-05-26 PROCEDURE — G0378 HOSPITAL OBSERVATION PER HR: HCPCS

## 2020-05-26 PROCEDURE — 80053 COMPREHEN METABOLIC PANEL: CPT | Performed by: EMERGENCY MEDICINE

## 2020-05-26 PROCEDURE — 85025 COMPLETE CBC W/AUTO DIFF WBC: CPT

## 2020-05-26 PROCEDURE — 83735 ASSAY OF MAGNESIUM: CPT

## 2020-05-26 PROCEDURE — 84484 ASSAY OF TROPONIN QUANT: CPT

## 2020-05-26 PROCEDURE — 96361 HYDRATE IV INFUSION ADD-ON: CPT

## 2020-05-26 RX ORDER — ASPIRIN 81 MG/1
81 TABLET ORAL DAILY
Status: DISCONTINUED | OUTPATIENT
Start: 2020-05-27 | End: 2020-05-27 | Stop reason: HOSPADM

## 2020-05-26 RX ORDER — ASCORBIC ACID 500 MG
500 TABLET ORAL DAILY
COMMUNITY

## 2020-05-26 RX ORDER — SODIUM CHLORIDE 9 MG/ML
75 INJECTION, SOLUTION INTRAVENOUS CONTINUOUS
Status: ACTIVE | OUTPATIENT
Start: 2020-05-26 | End: 2020-05-27

## 2020-05-26 RX ORDER — LANOLIN ALCOHOL/MO/W.PET/CERES
100 CREAM (GRAM) TOPICAL DAILY
COMMUNITY

## 2020-05-26 RX ORDER — SODIUM CHLORIDE 0.9 % (FLUSH) 0.9 %
10 SYRINGE (ML) INJECTION EVERY 12 HOURS SCHEDULED
Status: DISCONTINUED | OUTPATIENT
Start: 2020-05-26 | End: 2020-05-27 | Stop reason: HOSPADM

## 2020-05-26 RX ORDER — CHOLECALCIFEROL (VITAMIN D3) 125 MCG
5 CAPSULE ORAL NIGHTLY PRN
Status: DISCONTINUED | OUTPATIENT
Start: 2020-05-26 | End: 2020-05-27 | Stop reason: HOSPADM

## 2020-05-26 RX ORDER — SODIUM CHLORIDE 0.9 % (FLUSH) 0.9 %
10 SYRINGE (ML) INJECTION AS NEEDED
Status: DISCONTINUED | OUTPATIENT
Start: 2020-05-26 | End: 2020-05-27 | Stop reason: HOSPADM

## 2020-05-26 RX ORDER — ASPIRIN 81 MG/1
324 TABLET, CHEWABLE ORAL ONCE
Status: COMPLETED | OUTPATIENT
Start: 2020-05-26 | End: 2020-05-26

## 2020-05-26 RX ADMIN — ASPIRIN 81 MG 324 MG: 81 TABLET ORAL at 21:12

## 2020-05-26 RX ADMIN — SODIUM CHLORIDE 75 ML/HR: 9 INJECTION, SOLUTION INTRAVENOUS at 21:14

## 2020-05-27 ENCOUNTER — APPOINTMENT (OUTPATIENT)
Dept: CARDIOLOGY | Facility: HOSPITAL | Age: 65
End: 2020-05-27

## 2020-05-27 VITALS
WEIGHT: 160.27 LBS | BODY MASS INDEX: 27.36 KG/M2 | HEIGHT: 64 IN | RESPIRATION RATE: 16 BRPM | DIASTOLIC BLOOD PRESSURE: 79 MMHG | TEMPERATURE: 97.3 F | HEART RATE: 59 BPM | SYSTOLIC BLOOD PRESSURE: 168 MMHG | OXYGEN SATURATION: 100 %

## 2020-05-27 LAB
ANION GAP SERPL CALCULATED.3IONS-SCNC: 13 MMOL/L (ref 5–15)
BACTERIA UR QL AUTO: ABNORMAL /HPF
BILIRUB UR QL STRIP: NEGATIVE
BUN BLD-MCNC: 13 MG/DL (ref 8–23)
BUN/CREAT SERPL: 16.5 (ref 7–25)
CALCIUM SPEC-SCNC: 8.7 MG/DL (ref 8.6–10.5)
CHLORIDE SERPL-SCNC: 104 MMOL/L (ref 98–107)
CHOLEST SERPL-MCNC: 215 MG/DL (ref 0–200)
CLARITY UR: CLEAR
CO2 SERPL-SCNC: 23 MMOL/L (ref 22–29)
COLOR UR: YELLOW
CREAT BLD-MCNC: 0.79 MG/DL (ref 0.57–1)
DEPRECATED RDW RBC AUTO: 45.9 FL (ref 37–54)
ERYTHROCYTE [DISTWIDTH] IN BLOOD BY AUTOMATED COUNT: 13.4 % (ref 12.3–15.4)
GFR SERPL CREATININE-BSD FRML MDRD: 73 ML/MIN/1.73
GLUCOSE BLD-MCNC: 106 MG/DL (ref 65–99)
GLUCOSE UR STRIP-MCNC: NEGATIVE MG/DL
HBA1C MFR BLD: 6.1 % (ref 4.8–5.6)
HCT VFR BLD AUTO: 41.3 % (ref 34–46.6)
HDLC SERPL-MCNC: 59 MG/DL (ref 40–60)
HGB BLD-MCNC: 13 G/DL (ref 12–15.9)
HGB UR QL STRIP.AUTO: NEGATIVE
HYALINE CASTS UR QL AUTO: ABNORMAL /LPF
KETONES UR QL STRIP: ABNORMAL
LDLC SERPL CALC-MCNC: 135 MG/DL (ref 0–100)
LDLC/HDLC SERPL: 2.29 {RATIO}
LEUKOCYTE ESTERASE UR QL STRIP.AUTO: ABNORMAL
MCH RBC QN AUTO: 29 PG (ref 26.6–33)
MCHC RBC AUTO-ENTMCNC: 31.5 G/DL (ref 31.5–35.7)
MCV RBC AUTO: 92.2 FL (ref 79–97)
NITRITE UR QL STRIP: NEGATIVE
PH UR STRIP.AUTO: 6.5 [PH] (ref 5–8)
PLATELET # BLD AUTO: 269 10*3/MM3 (ref 140–450)
PMV BLD AUTO: 8.5 FL (ref 6–12)
POTASSIUM BLD-SCNC: 3.4 MMOL/L (ref 3.5–5.2)
PROT UR QL STRIP: NEGATIVE
RBC # BLD AUTO: 4.48 10*6/MM3 (ref 3.77–5.28)
RBC # UR: ABNORMAL /HPF
REF LAB TEST METHOD: ABNORMAL
SODIUM BLD-SCNC: 140 MMOL/L (ref 136–145)
SP GR UR STRIP: 1.03 (ref 1–1.03)
SQUAMOUS #/AREA URNS HPF: ABNORMAL /HPF
TRIGL SERPL-MCNC: 103 MG/DL (ref 0–150)
TROPONIN T SERPL-MCNC: <0.01 NG/ML (ref 0–0.03)
TSH SERPL DL<=0.05 MIU/L-ACNC: 5.37 UIU/ML (ref 0.27–4.2)
UROBILINOGEN UR QL STRIP: ABNORMAL
VLDLC SERPL-MCNC: 20.6 MG/DL
WBC NRBC COR # BLD: 7.69 10*3/MM3 (ref 3.4–10.8)
WBC UR QL AUTO: ABNORMAL /HPF

## 2020-05-27 PROCEDURE — 96361 HYDRATE IV INFUSION ADD-ON: CPT

## 2020-05-27 PROCEDURE — 93010 ELECTROCARDIOGRAM REPORT: CPT | Performed by: INTERNAL MEDICINE

## 2020-05-27 PROCEDURE — 78492 MYOCRD IMG PET MLT RST&STRS: CPT

## 2020-05-27 PROCEDURE — 81001 URINALYSIS AUTO W/SCOPE: CPT | Performed by: PHYSICIAN ASSISTANT

## 2020-05-27 PROCEDURE — 83036 HEMOGLOBIN GLYCOSYLATED A1C: CPT | Performed by: PHYSICIAN ASSISTANT

## 2020-05-27 PROCEDURE — G0378 HOSPITAL OBSERVATION PER HR: HCPCS

## 2020-05-27 PROCEDURE — 80048 BASIC METABOLIC PNL TOTAL CA: CPT | Performed by: PHYSICIAN ASSISTANT

## 2020-05-27 PROCEDURE — 93017 CV STRESS TEST TRACING ONLY: CPT

## 2020-05-27 PROCEDURE — 93005 ELECTROCARDIOGRAM TRACING: CPT | Performed by: PHYSICIAN ASSISTANT

## 2020-05-27 PROCEDURE — 84484 ASSAY OF TROPONIN QUANT: CPT | Performed by: PHYSICIAN ASSISTANT

## 2020-05-27 PROCEDURE — 85027 COMPLETE CBC AUTOMATED: CPT | Performed by: PHYSICIAN ASSISTANT

## 2020-05-27 PROCEDURE — 80061 LIPID PANEL: CPT | Performed by: PHYSICIAN ASSISTANT

## 2020-05-27 PROCEDURE — 84443 ASSAY THYROID STIM HORMONE: CPT | Performed by: PHYSICIAN ASSISTANT

## 2020-05-27 PROCEDURE — 99217 PR OBSERVATION CARE DISCHARGE MANAGEMENT: CPT | Performed by: INTERNAL MEDICINE

## 2020-05-27 RX ORDER — ASPIRIN 81 MG/1
81 TABLET ORAL DAILY
Start: 2020-05-28 | End: 2020-06-25

## 2020-05-27 RX ADMIN — ASPIRIN 81 MG: 81 TABLET, COATED ORAL at 08:21

## 2020-05-27 RX ADMIN — SODIUM CHLORIDE, PRESERVATIVE FREE 10 ML: 5 INJECTION INTRAVENOUS at 08:21

## 2020-06-02 ENCOUNTER — NURSE TRIAGE (OUTPATIENT)
Dept: CALL CENTER | Facility: HOSPITAL | Age: 65
End: 2020-06-02

## 2020-06-02 ENCOUNTER — TELEPHONE (OUTPATIENT)
Dept: INTERNAL MEDICINE | Facility: CLINIC | Age: 65
End: 2020-06-02

## 2020-06-02 NOTE — TELEPHONE ENCOUNTER
"This pt. Was seen in ER at [facility] 5/27, she was told to follow up with Neurology and when she called her Neurologist, they have swithced practices needs referral. Pt.has no PCP to refer her. Triage nurse called ER at [facility] and spoke with Director, Sintia and she gave a PCP referral number so she can get set up with a PCP practice that can refer her to Neurologist if needed. Gave this number [phone]    Reason for Disposition  • [1] Follow-up call to recent contact AND [2] information only call, no triage required    Additional Information  • Negative: [1] Caller is not with the adult (patient) AND [2] reporting urgent symptoms  • Negative: Lab result questions  • Negative: Medication questions  • Negative: Caller can't be reached by phone  • Negative: Caller has already spoken to PCP or another triager  • Negative: RN needs further essential information from caller in order to complete triage  • Negative: Requesting regular office appointment  • Negative: [1] Caller requesting NON-URGENT health information AND [2] PCP's office is the best resource  • Negative: Health Information question, no triage required and triager able to answer question  • Negative: General information question, no triage required and triager able to answer question  • Negative: Question about upcoming scheduled test, no triage required and triager able to answer question  • Negative: [1] Caller is not with the adult (patient) AND [2] probable NON-URGENT symptoms    Answer Assessment - Initial Assessment Questions  1. REASON FOR CALL or QUESTION: \"What is your reason for calling today?\" or \"How can I best help you?\" or \"What question do you have that I can help answer?\"      Needing referral for neurology    Protocols used: INFORMATION ONLY CALL-ADULT-      "

## 2020-06-02 NOTE — TELEPHONE ENCOUNTER
Patient called and stated that she needs a referral to Neurology after a stint in the ER.  Patient stated that she would like to go to Dr. Smooth Bhatt, [phone], [address] , [id number].    Patient callback: [phone]    Please advise.

## 2020-06-10 ENCOUNTER — OFFICE VISIT (OUTPATIENT)
Dept: INTERNAL MEDICINE | Facility: CLINIC | Age: 65
End: 2020-06-10

## 2020-06-10 VITALS
DIASTOLIC BLOOD PRESSURE: 82 MMHG | SYSTOLIC BLOOD PRESSURE: 140 MMHG | RESPIRATION RATE: 16 BRPM | OXYGEN SATURATION: 98 % | HEIGHT: 64 IN | HEART RATE: 102 BPM | TEMPERATURE: 98.2 F | BODY MASS INDEX: 26.63 KG/M2 | WEIGHT: 156 LBS

## 2020-06-10 DIAGNOSIS — R53.1 LEFT-SIDED WEAKNESS: Primary | ICD-10-CM

## 2020-06-10 DIAGNOSIS — I45.10 RIGHT BUNDLE BRANCH BLOCK: ICD-10-CM

## 2020-06-10 PROCEDURE — 99213 OFFICE O/P EST LOW 20 MIN: CPT | Performed by: NURSE PRACTITIONER

## 2020-06-10 NOTE — PROGRESS NOTES
Coleen Kohler is a 65 y.o. female who presents today to establish care and get neurology referral.    Chief Complaint   Patient presents with   • Establish Care     Pt needs referral due to insurance       65-year-old female presents to establish care with a new provider and to get a neurology referral.  She was recently in the [facility] ER and was admitted for couple nights for left-sided weakness, tingling, and slight numbness.  They did a cardiac work-up since she has a history of a right bundle branch block.  She is scheduled to follow-up with cardiology in the next month.  They did suggest that she follow-up with neurology as well to see why she is having this left-sided weakness come intermittently.  CT was negative for CVA and since leaving the hospital she is still having occasional dizzy moments but is able to do all ADLs and resume her normal activities.      The following portions of the patient's history were reviewed and updated as appropriate: allergies, current medications, past family history, past medical history, past social history, past surgical history and problem list.     Past Medical History:   Diagnosis Date   • Kidney disorder     Kidney stone       Past Surgical History:   Procedure Laterality Date   • CATARACT EXTRACTION         Family History   Problem Relation Age of Onset   • Hypertension Mother    • Hyperlipidemia Mother    • Diabetes Mother    • Hypertension Father    • No Known Problems Sister    • Breast cancer Neg Hx    • Ovarian cancer Neg Hx        Social History     Socioeconomic History   • Marital status:      Spouse name: Not on file   • Number of children: Not on file   • Years of education: Not on file   • Highest education level: Not on file   Tobacco Use   • Smoking status: Never Smoker   • Smokeless tobacco: Never Used   Substance and Sexual Activity   • Alcohol use: Yes     Alcohol/week: 1.0 standard drinks     Types: 1 Glasses of wine per week     Frequency:  "2-4 times a month   • Drug use: No   • Sexual activity: Defer       No Known Allergies      Current Outpatient Medications:   •  aspirin 81 MG EC tablet, Take 1 tablet by mouth Daily., Disp: , Rfl:   •  Magnesium Oxide -Mg Supplement 400 MG capsule, Take 400 mg by mouth Daily., Disp: , Rfl:   •  vitamin B-6 (PYRIDOXINE) 50 MG tablet, Take 100 mg by mouth Daily., Disp: , Rfl:   •  vitamin C (ASCORBIC ACID) 500 MG tablet, Take 500 mg by mouth Daily., Disp: , Rfl:     Health Maintenance   Topic Date Due   • ANNUAL PHYSICAL  02/28/1958   • TDAP/TD VACCINES (1 - Tdap) 02/28/1965   • ZOSTER VACCINE (1 of 2) 02/28/2004   • HEPATITIS C SCREENING  09/05/2017   • COLONOSCOPY  09/05/2017   • Pneumococcal Vaccine Once at 65 Years Old  02/28/2019   • INFLUENZA VACCINE  08/01/2020   • MAMMOGRAM  03/15/2021        ROS    Review of Systems   Constitutional: Negative for chills and fever.   Eyes: Negative for blurred vision and visual disturbance.   Respiratory: Negative for cough and shortness of breath.    Cardiovascular: Negative for chest pain, palpitations and leg swelling.   Gastrointestinal: Negative for abdominal pain and nausea.   Genitourinary: Negative for dysuria, urgency and urinary incontinence.   Musculoskeletal: Positive for neck pain and neck stiffness.   Skin: Negative for rash.   Allergic/Immunologic: Negative for immunocompromised state.   Neurological: Positive for light-headedness (recurrent) and numbness (occasional). Negative for weakness and headache.   Hematological: Negative for adenopathy. Bruises/bleeds easily.   Psychiatric/Behavioral: Negative for self-injury, suicidal ideas and depressed mood.       Visit Vitals  /82   Pulse 102   Temp 98.2 °F (36.8 °C)   Resp 16   Ht 162.6 cm (64\")   Wt 70.8 kg (156 lb)   SpO2 98%   Breastfeeding No   BMI 26.78 kg/m²       Physical Exam     Physical Exam   Constitutional: She is oriented to person, place, and time. She appears well-developed and well-nourished. " No distress.   HENT:   Head: Normocephalic and atraumatic.   Eyes: Pupils are equal, round, and reactive to light. Conjunctivae and EOM are normal.       Mild swelling   Neck: Normal range of motion. Neck supple. No thyromegaly present.   Cardiovascular: Normal rate, regular rhythm and normal heart sounds. Exam reveals no gallop and no friction rub.   No murmur heard.  Pulmonary/Chest: Effort normal and breath sounds normal.   Abdominal: Soft. Bowel sounds are normal.   Musculoskeletal: Normal range of motion. She exhibits no edema, tenderness or deformity.   Lymphadenopathy:     She has no cervical adenopathy.   Neurological: She is alert and oriented to person, place, and time. She has normal strength. She displays normal reflexes. No cranial nerve deficit or sensory deficit. She displays a negative Romberg sign. GCS eye subscore is 4. GCS verbal subscore is 5. GCS motor subscore is 6.   Skin: Skin is warm. Capillary refill takes less than 2 seconds. Bruising (from IV sticks) noted. No rash noted. She is not diaphoretic.        Psychiatric: She has a normal mood and affect. Her behavior is normal. Judgment and thought content normal.   Nursing note and vitals reviewed.      Assessment/Plan   Problems Addressed this Visit        Cardiovascular and Mediastinum    Right bundle branch block     Continue seeing cardiology and following up as appropriate         Relevant Orders    TSH Rfx On Abnormal To Free T4    Hemoglobin A1c    Comprehensive Metabolic Panel       Nervous and Auditory    Left-sided weakness - Primary     Neurology referral placed.  If symptoms worsen report to the ER immediately.         Relevant Orders    Ambulatory Referral to Neurology (Completed)    Lipid Panel              Plan of care reviewed with patient at the conclusion of today's visit. Education was provided regarding diagnosis, management and any prescribed or recommended OTC medications.  Patient verbalizes understanding of and  "agreement with management plan.    “I, the teaching provider, verify the accuracy of all student documentation.  I further attest that I was physically present during the HPI portion of the encounter, and personally performed/re-performed the exam, assessment, and plan.\" Caio Baptiste, MSN, APRN, FNP-C    Return in about 3 months (around 9/10/2020) for Recheck, Come back for fasting labs after your appointment.    Caio Baptiste, APRN  "

## 2020-06-11 ENCOUNTER — LAB (OUTPATIENT)
Dept: LAB | Facility: HOSPITAL | Age: 65
End: 2020-06-11

## 2020-06-11 DIAGNOSIS — I45.10 RIGHT BUNDLE BRANCH BLOCK: ICD-10-CM

## 2020-06-11 DIAGNOSIS — R53.1 LEFT-SIDED WEAKNESS: ICD-10-CM

## 2020-06-11 LAB
ALBUMIN SERPL-MCNC: 4.2 G/DL (ref 3.5–5.2)
ALBUMIN/GLOB SERPL: 1.4 G/DL
ALP SERPL-CCNC: 64 U/L (ref 39–117)
ALT SERPL W P-5'-P-CCNC: 14 U/L (ref 1–33)
ANION GAP SERPL CALCULATED.3IONS-SCNC: 11 MMOL/L (ref 5–15)
AST SERPL-CCNC: 16 U/L (ref 1–32)
BILIRUB SERPL-MCNC: 0.4 MG/DL (ref 0.2–1.2)
BUN BLD-MCNC: 15 MG/DL (ref 8–23)
BUN/CREAT SERPL: 16.9 (ref 7–25)
CALCIUM SPEC-SCNC: 9.4 MG/DL (ref 8.6–10.5)
CHLORIDE SERPL-SCNC: 106 MMOL/L (ref 98–107)
CHOLEST SERPL-MCNC: 199 MG/DL (ref 0–200)
CO2 SERPL-SCNC: 23 MMOL/L (ref 22–29)
CREAT BLD-MCNC: 0.89 MG/DL (ref 0.57–1)
GFR SERPL CREATININE-BSD FRML MDRD: 63 ML/MIN/1.73
GLOBULIN UR ELPH-MCNC: 2.9 GM/DL
GLUCOSE BLD-MCNC: 115 MG/DL (ref 65–99)
HBA1C MFR BLD: 6 % (ref 4.8–5.6)
HDLC SERPL-MCNC: 60 MG/DL (ref 40–60)
LDLC SERPL CALC-MCNC: 119 MG/DL (ref 0–100)
LDLC/HDLC SERPL: 1.98 {RATIO}
POTASSIUM BLD-SCNC: 4.2 MMOL/L (ref 3.5–5.2)
PROT SERPL-MCNC: 7.1 G/DL (ref 6–8.5)
SODIUM BLD-SCNC: 140 MMOL/L (ref 136–145)
TRIGL SERPL-MCNC: 100 MG/DL (ref 0–150)
TSH SERPL DL<=0.05 MIU/L-ACNC: 3.01 UIU/ML (ref 0.27–4.2)
VLDLC SERPL-MCNC: 20 MG/DL (ref 5–40)

## 2020-06-11 PROCEDURE — 80053 COMPREHEN METABOLIC PANEL: CPT | Performed by: NURSE PRACTITIONER

## 2020-06-11 PROCEDURE — 80061 LIPID PANEL: CPT | Performed by: NURSE PRACTITIONER

## 2020-06-11 PROCEDURE — 84443 ASSAY THYROID STIM HORMONE: CPT | Performed by: NURSE PRACTITIONER

## 2020-06-11 PROCEDURE — 83036 HEMOGLOBIN GLYCOSYLATED A1C: CPT | Performed by: NURSE PRACTITIONER

## 2020-06-16 ENCOUNTER — TRANSCRIBE ORDERS (OUTPATIENT)
Dept: ADMINISTRATIVE | Facility: HOSPITAL | Age: 65
End: 2020-06-16

## 2020-06-16 DIAGNOSIS — Z78.0 POSTMENOPAUSAL: Primary | ICD-10-CM

## 2020-06-17 ENCOUNTER — TELEPHONE (OUTPATIENT)
Dept: INTERNAL MEDICINE | Facility: CLINIC | Age: 65
End: 2020-06-17

## 2020-06-19 ENCOUNTER — HOSPITAL ENCOUNTER (OUTPATIENT)
Dept: MAMMOGRAPHY | Facility: HOSPITAL | Age: 65
Discharge: HOME | End: 2020-06-19
Admitting: OBSTETRICS & GYNECOLOGY

## 2020-06-19 DIAGNOSIS — Z12.31 VISIT FOR SCREENING MAMMOGRAM: ICD-10-CM

## 2020-06-19 PROCEDURE — 77063 BREAST TOMOSYNTHESIS BI: CPT

## 2020-06-19 PROCEDURE — 77067 SCR MAMMO BI INCL CAD: CPT

## 2020-06-19 PROCEDURE — 77067 SCR MAMMO BI INCL CAD: CPT | Performed by: RADIOLOGY

## 2020-06-19 PROCEDURE — 77063 BREAST TOMOSYNTHESIS BI: CPT | Performed by: RADIOLOGY

## 2020-06-25 ENCOUNTER — CONSULT (OUTPATIENT)
Dept: CARDIOLOGY | Facility: CLINIC | Age: 65
End: 2020-06-25

## 2020-06-25 VITALS
SYSTOLIC BLOOD PRESSURE: 120 MMHG | WEIGHT: 157.8 LBS | TEMPERATURE: 96.8 F | HEART RATE: 76 BPM | DIASTOLIC BLOOD PRESSURE: 80 MMHG | HEIGHT: 64 IN | BODY MASS INDEX: 26.94 KG/M2 | OXYGEN SATURATION: 98 %

## 2020-06-25 DIAGNOSIS — I45.10 RIGHT BUNDLE BRANCH BLOCK: Primary | ICD-10-CM

## 2020-06-25 DIAGNOSIS — R94.31 ABNORMAL EKG: ICD-10-CM

## 2020-06-25 PROCEDURE — 93000 ELECTROCARDIOGRAM COMPLETE: CPT | Performed by: NURSE PRACTITIONER

## 2020-06-25 PROCEDURE — 99213 OFFICE O/P EST LOW 20 MIN: CPT | Performed by: NURSE PRACTITIONER

## 2020-06-25 NOTE — PROGRESS NOTES
"[facility] Cardiology  Consultation note    Subjective:     Patient ID: Coleen Kholer is a 65 y.o. female.  Referring provider: No ref. provider found     Reason for consultation:   Chief Complaint   Patient presents with   • RBBB      Problem List:  1. Right Bundle Branch Block  a. Diagnosis 2017.  2. Chest pain:   a. CXR negative, 05/26/2020.  b. Attempted PET stress test, 05/26/2020. However, no IV could be obtained despite multiple tries, and in the end patient asked to go home and f/up with [facility] outpatient Troponins were negative x2 and ECG showed no acute ischemia/infarct.  3. Stroke-like symptoms:  a. Patient presented to [facility] ED on 5/26/2020 with dizziness and left-sided weakness.   b. MRI negative, 05/26/2020    No Known Allergies      Current Outpatient Medications:   •  Magnesium Oxide -Mg Supplement 400 MG capsule, Take 400 mg by mouth Daily., Disp: , Rfl:   •  vitamin B-6 (PYRIDOXINE) 50 MG tablet, Take 100 mg by mouth Daily., Disp: , Rfl:   •  vitamin C (ASCORBIC ACID) 500 MG tablet, Take 500 mg by mouth Daily., Disp: , Rfl:     HPI:      Coleen Kohler is a 65 y.o. female who present today to [facility] care. She was last seen in our office in October of 2017 for evaluation of her right bundle branch block. Patient returns due to recent hospitalization at [facility] for dizziness, and stroke-like symptoms. During that admission she had an EKG which was read as abnormal. She denies having chest pain during that admission despite documentation of this. They attempted to get a PET stress test to rule out ischemia but they were not able to get an IV. Patient requested to defer stress test and f/u with us as an outpatient. Patient denies chest/neck/jaw/shoulder/back pain. No shortness of breath. She uses exercise bike 30 min a day several days a week without any issues. She states \"dizziness\" is really more of a \"heavy headed\" feeling. It gets better throughout the day and is " "also improved with water and exercise. She denies lightheadedness, vertigo or significant orthostasis. She has no other cardiac complaints.     Cardiac Risk Factors:  The following portions of the patient's history were reviewed and updated as appropriate: allergies, current medications and problem list.    Social History     Socioeconomic History   • Marital status:      Spouse name: Not on file   • Number of children: Not on file   • Years of education: Not on file   • Highest education level: Not on file   Tobacco Use   • Smoking status: Never Smoker   • Smokeless tobacco: Never Used   Substance and Sexual Activity   • Alcohol use: Yes     Alcohol/week: 1.0 standard drinks     Types: 1 Glasses of wine per week     Frequency: 2-4 times a month   • Drug use: No   • Sexual activity: Defer     Family History   Problem Relation Age of Onset   • Hypertension Mother    • Hyperlipidemia Mother    • Diabetes Mother    • Hypertension Father    • No Known Problems Sister    • Breast cancer Neg Hx    • Ovarian cancer Neg Hx        Review of Systems:    Constitution: Negative for chills, fatigue, fever, and generalized weakness.   Cardiovascular: Pertinent positives in HPI, otherwise negative.  Respiratory: Pertinent positives in HPI, otherwise negative.  HENT: Negative for ear pain, nosebleeds, and tinnitus.  Gastrointestinal: Negative for abdominal pain, constipation, diarrhea, nausea and vomiting.   Genitourinary: No urinary symptoms   Musculoskeletal: Negative for muscle cramps.  Neurological: + headaches, no loss of balance, numbness, and symptoms of stroke.  Psychiatric: Normal mental status.           Objective:     Vitals:    06/25/20 0920   BP: 120/80   BP Location: Left arm   Patient Position: Sitting   Pulse: 76   Temp: 96.8 °F (36 °C)   SpO2: 98%   Weight: 71.6 kg (157 lb 12.8 oz)   Height: 162.6 cm (64\")        Physical Exam:  General: Alert and oriented.  Neck: Jugular venous pressure is within normal " limits. Carotids have normal upstrokes without bruits.   Cardiovascular: Heart has a nondisplaced focal PMI. Regular rate and rhythm. No murmur, gallop or rub.  Lungs: Clear, no rales or wheezes. Equal expansion is noted.   Peripheral Vascular: Femoral pulses are 2+ and symmetrical without bruits. Posterior tibial and dorsalis pedis pulses are 2+ and symmetrical. There is no peripheral edema.   Skin: Warm and dry.  Neurologic: Nonfocal.       Diagnostic Data (reviewed):    Lab Results   Component Value Date    GLUCOSE 115 (H) 06/11/2020    BUN 15 06/11/2020    CREATININE 0.89 06/11/2020    EGFRIFNONA 63 06/11/2020    BCR 16.9 06/11/2020    K 4.2 06/11/2020    CO2 23.0 06/11/2020    CALCIUM 9.4 06/11/2020    ALBUMIN 4.20 06/11/2020    ALKPHOS 64 06/11/2020    AST 16 06/11/2020    ALT 14 06/11/2020      Lab Results   Component Value Date    WBC 7.69 05/27/2020    RBC 4.48 05/27/2020    HGB 13.0 05/27/2020    HCT 41.3 05/27/2020    MCV 92.2 05/27/2020    MCH 29.0 05/27/2020     05/27/2020      Lab Results   Component Value Date    CHOL 199 06/11/2020    TRIG 100 06/11/2020    HDL 60 06/11/2020     (H) 06/11/2020      Lab Results   Component Value Date    HGBA1C 6.00 (H) 06/11/2020          ECG 12 Lead  Date/Time: 6/25/2020 10:35 AM  Performed by: Leslie Jimenez APRN  Authorized by: Leslie Jimenez APRN   Comparison: compared with previous ECG from 9/5/2017  Similar to previous ECG  Rhythm: sinus rhythm  Conduction: right bundle branch block  Other findings: T wave abnormality    Clinical impression: abnormal EKG                Assessment:       ICD-10-CM ICD-9-CM   1. Right bundle branch block I45.10 426.4   2. Abnormal EKG R94.31 794.31            Plan:     1. Reviewed/compared EKG to prior in 2017. No sig difference. In setting of no sig difference between EKG's and the fact that she is asymptomatic, no further cardiac workup needed at this time.   2. Encouraged neurology evaluation for  numbness on left side and dizziness.   3. Follow-up as needed.      Electronically signed by ORESTES Machado, 06/25/20, 10:35 AM.

## 2020-07-10 ENCOUNTER — TRANSCRIBE ORDERS (OUTPATIENT)
Dept: ADMINISTRATIVE | Facility: HOSPITAL | Age: 65
End: 2020-07-10

## 2020-07-10 DIAGNOSIS — R42 DIZZINESS AND GIDDINESS: Primary | ICD-10-CM

## 2020-07-29 ENCOUNTER — HOSPITAL ENCOUNTER (OUTPATIENT)
Dept: CARDIOLOGY | Facility: HOSPITAL | Age: 65
Discharge: HOME | End: 2020-07-29
Admitting: PSYCHIATRY & NEUROLOGY

## 2020-07-29 VITALS — WEIGHT: 157 LBS | HEIGHT: 64 IN | BODY MASS INDEX: 26.8 KG/M2

## 2020-07-29 DIAGNOSIS — R42 DIZZINESS AND GIDDINESS: ICD-10-CM

## 2020-07-29 PROCEDURE — 93880 EXTRACRANIAL BILAT STUDY: CPT

## 2020-07-29 PROCEDURE — 93880 EXTRACRANIAL BILAT STUDY: CPT | Performed by: INTERNAL MEDICINE

## 2020-07-30 LAB
BH CV ECHO MEAS - BSA(HAYCOCK): 1.8 M^2
BH CV ECHO MEAS - BSA: 1.8 M^2
BH CV ECHO MEAS - BZI_BMI: 26.9 KILOGRAMS/M^2
BH CV ECHO MEAS - BZI_METRIC_HEIGHT: 162.6 CM
BH CV ECHO MEAS - BZI_METRIC_WEIGHT: 71.2 KG
BH CV XLRA MEAS LEFT DIST CCA EDV: 27.4 CM/SEC
BH CV XLRA MEAS LEFT DIST CCA PSV: 80.7 CM/SEC
BH CV XLRA MEAS LEFT DIST ICA EDV: 63.4 CM/SEC
BH CV XLRA MEAS LEFT DIST ICA PSV: 174.4 CM/SEC
BH CV XLRA MEAS LEFT ICA/CCA RATIO: 1.68
BH CV XLRA MEAS LEFT MID CCA EDV: 27.3 CM/SEC
BH CV XLRA MEAS LEFT MID CCA PSV: 96.7 CM/SEC
BH CV XLRA MEAS LEFT MID ICA EDV: 52.4 CM/SEC
BH CV XLRA MEAS LEFT MID ICA PSV: 162.2 CM/SEC
BH CV XLRA MEAS LEFT PROX CCA EDV: 29.4 CM/SEC
BH CV XLRA MEAS LEFT PROX CCA PSV: 106.5 CM/SEC
BH CV XLRA MEAS LEFT PROX ECA EDV: 16.2 CM/SEC
BH CV XLRA MEAS LEFT PROX ECA PSV: 85.7 CM/SEC
BH CV XLRA MEAS LEFT PROX ICA EDV: 34.6 CM/SEC
BH CV XLRA MEAS LEFT PROX ICA PSV: 80.1 CM/SEC
BH CV XLRA MEAS LEFT PROX SCLA EDV: 0 CM/SEC
BH CV XLRA MEAS LEFT PROX SCLA PSV: 191 CM/SEC
BH CV XLRA MEAS LEFT VERTEBRAL A EDV: 19.3 CM/SEC
BH CV XLRA MEAS LEFT VERTEBRAL A PSV: 51.4 CM/SEC
BH CV XLRA MEAS RIGHT DIST CCA EDV: 22 CM/SEC
BH CV XLRA MEAS RIGHT DIST CCA PSV: 80.1 CM/SEC
BH CV XLRA MEAS RIGHT DIST ICA EDV: 42.7 CM/SEC
BH CV XLRA MEAS RIGHT DIST ICA PSV: 151.2 CM/SEC
BH CV XLRA MEAS RIGHT ICA/CCA RATIO: 1.23
BH CV XLRA MEAS RIGHT MID CCA EDV: 20.9 CM/SEC
BH CV XLRA MEAS RIGHT MID CCA PSV: 93.3 CM/SEC
BH CV XLRA MEAS RIGHT MID ICA EDV: 43.9 CM/SEC
BH CV XLRA MEAS RIGHT MID ICA PSV: 115 CM/SEC
BH CV XLRA MEAS RIGHT PROX CCA EDV: 14.8 CM/SEC
BH CV XLRA MEAS RIGHT PROX CCA PSV: 79 CM/SEC
BH CV XLRA MEAS RIGHT PROX ECA EDV: 13.7 CM/SEC
BH CV XLRA MEAS RIGHT PROX ECA PSV: 78.5 CM/SEC
BH CV XLRA MEAS RIGHT PROX ICA EDV: 29.1 CM/SEC
BH CV XLRA MEAS RIGHT PROX ICA PSV: 82.9 CM/SEC
BH CV XLRA MEAS RIGHT PROX SCLA EDV: 0 CM/SEC
BH CV XLRA MEAS RIGHT PROX SCLA PSV: 123.9 CM/SEC
BH CV XLRA MEAS RIGHT VERTEBRAL A EDV: 21.1 CM/SEC
BH CV XLRA MEAS RIGHT VERTEBRAL A PSV: 68.8 CM/SEC
LEFT ARM BP: NORMAL MMHG
RIGHT ARM BP: NORMAL MMHG

## 2020-08-06 ENCOUNTER — TELEPHONE (OUTPATIENT)
Dept: CARDIOLOGY | Facility: CLINIC | Age: 65
End: 2020-08-06

## 2020-08-06 NOTE — TELEPHONE ENCOUNTER
"PT called and requested that Dr. Milan review the carotid ordered by neuro-     Dr. Milan reviewed and recommends either atorvastatin 20 mg or rosuvastatin 10 mg daily and an ASA 81 mg daily-     I relayed the recommendations to pt and she is very hesitant on using statin medications- she states that \"all\" the literature she has read talks about how statins are not beneficial and how bad the side effects are -     I will send a msg through [employer] with med suggestions and she will let me know what/if she decides- she will f/u with Dr. Bhatt, neuro regarding her dizzy spells      "

## 2020-08-11 DIAGNOSIS — I65.23 BILATERAL CAROTID ARTERY STENOSIS: Primary | ICD-10-CM

## 2020-08-24 RX ORDER — ATORVASTATIN CALCIUM 20 MG/1
20 TABLET, FILM COATED ORAL DAILY
Qty: 90 TABLET | Refills: 3 | Status: SHIPPED | OUTPATIENT
Start: 2020-08-24 | End: 2022-06-30

## 2020-10-28 ENCOUNTER — HOSPITAL ENCOUNTER (OUTPATIENT)
Dept: BONE DENSITY | Facility: HOSPITAL | Age: 65
Discharge: HOME | End: 2020-10-28
Admitting: OBSTETRICS & GYNECOLOGY

## 2020-10-28 DIAGNOSIS — Z78.0 POSTMENOPAUSAL: ICD-10-CM

## 2020-10-28 PROCEDURE — 77080 DXA BONE DENSITY AXIAL: CPT

## 2021-05-14 ENCOUNTER — TRANSCRIBE ORDERS (OUTPATIENT)
Dept: ADMINISTRATIVE | Facility: HOSPITAL | Age: 66
End: 2021-05-14

## 2021-05-14 DIAGNOSIS — Z12.31 VISIT FOR SCREENING MAMMOGRAM: Primary | ICD-10-CM

## 2021-06-08 ENCOUNTER — APPOINTMENT (OUTPATIENT)
Dept: CARDIOLOGY | Facility: HOSPITAL | Age: 66
End: 2021-06-08

## 2021-06-23 ENCOUNTER — APPOINTMENT (OUTPATIENT)
Dept: CARDIOLOGY | Facility: HOSPITAL | Age: 66
End: 2021-06-23

## 2021-06-29 NOTE — PROGRESS NOTES
[facility] Cardiology    Patient ID: Coleen Kohler is a 66 y.o. female.  : 1955   Contact: [phone]    Encounter date: 2021    PCP: Provider, No Known      Chief complaint:   Chief Complaint   Patient presents with   • Right bundle branch block     f/u       Problem List:  1. Right Bundle Branch Block  a. Diagnosis 2017.  2. Chest pain:   a. CXR negative, 2020.  b. Attempted PET stress test, 2020. However, no IV could be obtained despite multiple tries, and in the end patient asked to go home and f/up with [facility] outpatient Troponins were negative x2 and ECG showed no acute ischemia/infarct.  3. Migraine HAs  4. Stroke-like symptoms:  a. Patient presented to [facility] ED on 2020 with dizziness and left-sided weakness.   b. MRI negative, 2020  c. Carotid duplex, 2020: Bilateral ICA stenosis of 50-69%.     No Known Allergies    Current Medications:    Current Outpatient Medications:   •  atorvastatin (LIPITOR) 20 MG tablet, Take 1 tablet by mouth Daily., Disp: 90 tablet, Rfl: 3  •  Magnesium Oxide -Mg Supplement 400 MG capsule, Take 400 mg by mouth Daily., Disp: , Rfl:   •  vitamin B-6 (PYRIDOXINE) 50 MG tablet, Take 100 mg by mouth Daily., Disp: , Rfl:   •  vitamin C (ASCORBIC ACID) 500 MG tablet, Take 500 mg by mouth Daily., Disp: , Rfl:   •  vitamin D3 125 MCG (5000 UT) capsule capsule, Take 5,000 Units by mouth Daily., Disp: , Rfl:   •  Aspirin Buf,CaCarb-MgCarb-MgO, 81 MG tablet, aspirin 81 mg tablet  Take 1 tablet every day by oral route., Disp: , Rfl:   •  Zinc Sulfate (ZINC 15 PO), zinc, Disp: , Rfl:     HPI    Coleen Kohler is a 66 y.o. female who presents today for a follow up of dizziness, right bundle branch block, and cardiac risk factors. Since last visit, she reports intermittent dizzy spells one to two times a month but thinks they may be less pronounced compared to before. Caffiene improves it and normally begins to resolve as  "the day goes on. She was seen by a Dr. Bhatt who was unsure of the cause. She has a history of focal migraines but denies dizziness as one of the accompanying symptoms when one presents. She sleeps well at night and doesn't wake up tired. Patient denies chest pain, shortness of breath, palpitations, edema, and syncope.       The following portions of the patient's history were reviewed and updated as appropriate: allergies, current medications and problem list.    Pertinent positives as listed in the HPI.  All other systems reviewed are negative.         Vitals:    06/30/21 1323   BP: 130/78   BP Location: Left arm   Patient Position: Sitting   Pulse: 86   SpO2: 98%   Weight: 70 kg (154 lb 6.4 oz)   Height: 162.6 cm (64\")       Physical Exam:  General: Alert and oriented.  Neck: Jugular venous pressure is within normal limits. Carotids have normal upstrokes without bruits.   Cardiovascular: Heart has a nondisplaced focal PMI. Regular rate and rhythm. No murmur, gallop or rub.  Lungs: Clear, no rales or wheezes. Equal expansion is noted.   Extremities: Show no edema.  Skin: Warm and dry.  Neurologic: Nonfocal.     Diagnostic Data (reviewed with patient):     Lab Results   Component Value Date    GLUCOSE 115 (H) 06/11/2020    BUN 15 06/11/2020    CREATININE 0.89 06/11/2020    EGFRIFNONA 63 06/11/2020    BCR 16.9 06/11/2020     06/11/2020    K 4.2 06/11/2020     06/11/2020    CO2 23.0 06/11/2020    CALCIUM 9.4 06/11/2020    ALBUMIN 4.20 06/11/2020    ALKPHOS 64 06/11/2020    AST 16 06/11/2020    ALT 14 06/11/2020     Lab Results   Component Value Date    CHOL 199 06/11/2020    TRIG 100 06/11/2020    HDL 60 06/11/2020     (H) 06/11/2020      Lab Results   Component Value Date    WBC 7.69 05/27/2020    RBC 4.48 05/27/2020    HGB 13.0 05/27/2020    HCT 41.3 05/27/2020    MCV 92.2 05/27/2020     05/27/2020      Lab Results   Component Value Date    TSH 3.010 06/11/2020          ECG 12 " Lead    Date/Time: 6/30/2021 1:39 PM  Performed by: Maria Eugenia Milan MD  Authorized by: Maria Eugenia Milan MD   Comparison: compared with previous ECG from 6/25/2020  Similar to previous ECG  Rhythm: sinus rhythm  BPM: 77  Conduction: right bundle branch block  Other findings: T wave abnormality    Clinical impression: abnormal EKG              Assessment:    ICD-10-CM ICD-9-CM   1. Right bundle branch block  I45.10 426.4   2. Dizziness and giddiness  R42 780.4   3. Bilateral carotid artery stenosis  I65.23 433.10     433.30   4. Hyperlipidemia LDL goal <70  E78.5 272.4         Plan:  1. FLP/CMP ordered.   2. Patient will monitor her dizzy spells and sleep patterns; she'll notify our office if anything significant is presented.   3. Continue all other current medications.  4. F/up in 12 months, sooner if needed.    Scribed for Maria Eugenia Milan MD by Sylvia Wang. 6/30/2021 13:39 EDT      I Maria Eugenia Milan MD personally performed the services described in this documentation as scribed by the above individual in my presence, and it is both accurate and complete.    Maria Eugenia Milan MD, FACC

## 2021-06-30 ENCOUNTER — OFFICE VISIT (OUTPATIENT)
Dept: CARDIOLOGY | Facility: CLINIC | Age: 66
End: 2021-06-30

## 2021-06-30 ENCOUNTER — APPOINTMENT (OUTPATIENT)
Dept: CARDIOLOGY | Facility: HOSPITAL | Age: 66
End: 2021-06-30

## 2021-06-30 ENCOUNTER — HOSPITAL ENCOUNTER (OUTPATIENT)
Dept: MAMMOGRAPHY | Facility: HOSPITAL | Age: 66
Discharge: HOME | End: 2021-06-30
Admitting: INTERNAL MEDICINE

## 2021-06-30 VITALS
BODY MASS INDEX: 26.36 KG/M2 | WEIGHT: 154.4 LBS | HEART RATE: 86 BPM | HEIGHT: 64 IN | SYSTOLIC BLOOD PRESSURE: 130 MMHG | OXYGEN SATURATION: 98 % | DIASTOLIC BLOOD PRESSURE: 78 MMHG

## 2021-06-30 DIAGNOSIS — E78.5 HYPERLIPIDEMIA LDL GOAL <70: ICD-10-CM

## 2021-06-30 DIAGNOSIS — Z12.31 VISIT FOR SCREENING MAMMOGRAM: ICD-10-CM

## 2021-06-30 DIAGNOSIS — I65.23 BILATERAL CAROTID ARTERY STENOSIS: ICD-10-CM

## 2021-06-30 DIAGNOSIS — R42 DIZZINESS AND GIDDINESS: ICD-10-CM

## 2021-06-30 DIAGNOSIS — I45.10 RIGHT BUNDLE BRANCH BLOCK: Primary | ICD-10-CM

## 2021-06-30 PROCEDURE — 77067 SCR MAMMO BI INCL CAD: CPT

## 2021-06-30 PROCEDURE — 77063 BREAST TOMOSYNTHESIS BI: CPT | Performed by: RADIOLOGY

## 2021-06-30 PROCEDURE — 93000 ELECTROCARDIOGRAM COMPLETE: CPT | Performed by: INTERNAL MEDICINE

## 2021-06-30 PROCEDURE — 99214 OFFICE O/P EST MOD 30 MIN: CPT | Performed by: INTERNAL MEDICINE

## 2021-06-30 PROCEDURE — 77063 BREAST TOMOSYNTHESIS BI: CPT

## 2021-06-30 PROCEDURE — 77067 SCR MAMMO BI INCL CAD: CPT | Performed by: RADIOLOGY

## 2022-01-06 ENCOUNTER — SPECIALTY PHARMACY (OUTPATIENT)
Dept: PHARMACY | Facility: TELEHEALTH | Age: 67
End: 2022-01-06

## 2022-01-06 NOTE — PROGRESS NOTES
Specialty Pharmacy Refill Coordination Note     Coleen is a 66 y.o. female contacted today regarding refills of  [facility] specialty medication(s).    Reviewed and verified with patient:  Allergies  Meds       Specialty medication(s) and dose(s) confirmed: yes    Refill Questions      Most Recent Value   Changes to allergies? No   Changes to medications? No   New conditions since last clinic visit No   Unplanned office visit, urgent care, ED, or hospital admission in the last 4 weeks  No   How does patient/caregiver feel medication is working? Excellent   Financial problems or insurance changes  No   How many doses of your specialty medications were missed in the last 4 weeks? 0   Does this patient require a clinical escalation to a pharmacist? No                     Follow-up: 3 week(s)     Gianna Strong, Pharmacy Technician  Specialty Pharmacy Technician

## 2022-01-31 ENCOUNTER — SPECIALTY PHARMACY (OUTPATIENT)
Dept: PHARMACY | Facility: TELEHEALTH | Age: 67
End: 2022-01-31

## 2022-01-31 NOTE — PROGRESS NOTES
Specialty Pharmacy Refill Coordination Note     Coleen is a 66 y.o. female contacted today regarding refills of  [facility] specialty medication(s).    Reviewed and verified with patient:  Allergies  Meds       Specialty medication(s) and dose(s) confirmed: yes    Refill Questions      Most Recent Value   Changes to allergies? No   Changes to medications? No   New conditions since last clinic visit No   Unplanned office visit, urgent care, ED, or hospital admission in the last 4 weeks  No   How does patient/caregiver feel medication is working? Excellent   Financial problems or insurance changes  No   If yes, describe changes in insurance or financial issues. N/A   How many doses of your specialty medications were missed in the last 4 weeks? 0   Why were doses missed? N/A   Does this patient require a clinical escalation to a pharmacist? No                     Follow-up: 21 day(s)     Gianna Strong, Pharmacy Technician  Specialty Pharmacy Technician

## 2022-03-01 ENCOUNTER — SPECIALTY PHARMACY (OUTPATIENT)
Dept: PHARMACY | Facility: TELEHEALTH | Age: 67
End: 2022-03-01

## 2022-03-03 ENCOUNTER — SPECIALTY PHARMACY (OUTPATIENT)
Dept: PHARMACY | Facility: TELEHEALTH | Age: 67
End: 2022-03-03

## 2022-03-03 NOTE — PROGRESS NOTES
Specialty Pharmacy Patient Management Program  Reassessment     Coleen Kohler is a 67 y.o. female with Chronic Migraine and enrolled in the Patient Management program offered by [facility].  A follow-up outreach was conducted, including assessment of continued therapy appropriateness, medication adherence, and side effect incidence and management for Nurtec 75mg ODT.     Changes to Insurance Coverage or Financial Support  None at this time, will follow up April 1, 2022 to see if this needs prior authorization renewal.     Relevant Past Medical History and Comorbidities  Relevant medical history and concomitant health conditions were discussed with the patient. The patient's chart has been reviewed for relevant past medical history and comorbid health conditions and updated as necessary.   Past Medical History:   Diagnosis Date   • Kidney disorder     Kidney stone     Social History     Socioeconomic History   • Marital status:    Tobacco Use   • Smoking status: Never Smoker   • Smokeless tobacco: Never Used   Substance and Sexual Activity   • Alcohol use: Yes     Alcohol/week: 1.0 standard drink     Types: 1 Glasses of wine per week   • Drug use: No   • Sexual activity: Defer       Allergies  Known allergies and reactions were discussed with the patient. The patient's chart has been reviewed for allergy information and updated as necessary.   Patient has no known allergies.    Relevant Laboratory Values  No results for input(s): CMP, BMP, CBC in the last 72 hours.    Current Medication List  This medication list has been reviewed with the patient and evaluated for any interactions or necessary modifications/recommendations, and updated to include all prescription medications, OTC medications, and supplements the patient is currently taking.  This list reflects what is contained in the patient's profile, which has also been marked as reviewed to communicate to other providers it is  the most up to date version of the patient's current medication therapy.     Current Outpatient Medications:   •  Aspirin Buf,CaCarb-MgCarb-MgO, 81 MG tablet, aspirin 81 mg tablet  Take 1 tablet every day by oral route., Disp: , Rfl:   •  atorvastatin (LIPITOR) 20 MG tablet, Take 1 tablet by mouth Daily., Disp: 90 tablet, Rfl: 3  •  Magnesium Oxide -Mg Supplement 400 MG capsule, Take 400 mg by mouth Daily., Disp: , Rfl:   •  Rimegepant Sulfate (Nurtec) 75 MG tablet dispersible tablet, Take 1 tablet by mouth Daily As Needed., Disp: 8 tablet, Rfl: 11  •  vitamin B-6 (PYRIDOXINE) 50 MG tablet, Take 100 mg by mouth Daily., Disp: , Rfl:   •  vitamin C (ASCORBIC ACID) 500 MG tablet, Take 500 mg by mouth Daily., Disp: , Rfl:   •  vitamin D3 125 MCG (5000 UT) capsule capsule, Take 5,000 Units by mouth Daily., Disp: , Rfl:   •  Zinc Sulfate (ZINC 15 PO), zinc, Disp: , Rfl:     Drug Interactions  none     Adverse Drug Reactions  • Adverse Reactions Experienced: none   • Plan for ADR Management: not required, patient tolerating it well.  8 tablets seems to last a full 30 days.    Hospitalizations and Urgent Care Since Last Assessment  • Hospitalizations or Admissions: none   • ED Visits: none   • Urgent Office Visits: none     Adherence and Self-Administration  • Approximate Number of Doses Missed Since Last Assessment: none, patient uses prn     • Ongoing or New Barriers to Patient Adherence and/or Self-Administration: none   • Methods for Supporting Patient Adherence and/or Self-Administration: none required     Goals of Therapy  Progress Toward Meeting Patient-Identified Goals of Therapy: On Track  o New Patient-Identified Goals, If Applicable: maintain control of headaches with limited side effects    • Progress Toward Meeting Clinical Goals or Therapeutic Targets: On Track  o New Clinical Goals or Therapeutic Targets, If Applicable: ensure patient has some on hand.  Also will check coverage in April to see if further  authorization is needed.    Quality of Life Assessment   Quality of Life related to the patient's specialty therapy was discussed with the patient. The QOL segment of this outreach has been reviewed and updated.   • Quality of Life Score: 5    Reassessment Plan & Follow-Up  1. Medication Therapy Changes: none at this time  2. Additional Plans, Therapy Recommendations, or Therapy Problems to Be Addressed: none   3. Pharmacist to perform regular reassessments no more than (6) months from the previous assessment.  4. Care Coordinator to set up future refill outreaches, coordinate prescription delivery, and escalate clinical questions to pharmacist.     Attestation  I attest that the specialty medication(s) addressed above are appropriate for the patient based on my reassessment.  If the prescribed therapy is at any point deemed not appropriate based on the current or future assessments, a consultation will be initiated with the patient's specialty care provider to determine the best course of action. The revised plan of therapy will be documented along with any additional patient education provided.     Electronically signed by Khoa Lombardi RPH, 03/03/22, 9:03 AM EST.

## 2022-03-03 NOTE — PROGRESS NOTES
Specialty Pharmacy Refill Coordination Note     Coleen is a 67 y.o. female contacted today regarding refills of her specialty medication(s).    Specialty medication(s) and dose(s) confirmed: Nurtec 75mg ODT  Changes to medications: no  Changes to insurance: no  Reviewed and verified with patient: Yes    Refill Questions      Most Recent Value   Changes to allergies? No   Changes to medications? No   New conditions since last clinic visit No   Unplanned office visit, urgent care, ED, or hospital admission in the last 4 weeks  No   How does patient/caregiver feel medication is working? Very good   Financial problems or insurance changes  No   If yes, describe changes in insurance or financial issues. n/a   How many doses of your specialty medications were missed in the last 4 weeks? 0   Why were doses missed? n/a- patient uses prn   Does this patient require a clinical escalation to a pharmacist? No              Medication Adherence    What concerns does the patient have in regards to their medications: None at this time- patient uses prn  Any gaps in refill history greater than 2 weeks in the last 3 months: no  Demonstrates understanding of importance of adherence: yes  Informant: patient  Reliability of informant: reliable  Provider-estimated medication adherence level: %  Reasons for non-adherence: no problems identified  Adherence tools used: directed education  Support network for adherence: family member, healthcare provider          Follow-up: 3 week(s)     Khoa Lombardi RPH  Specialty Pharmacy Technician   3/3/2022   09:02 EST

## 2022-03-30 ENCOUNTER — SPECIALTY PHARMACY (OUTPATIENT)
Dept: PHARMACY | Facility: TELEHEALTH | Age: 67
End: 2022-03-30

## 2022-03-30 ENCOUNTER — DOCUMENTATION (OUTPATIENT)
Dept: PHARMACY | Facility: TELEHEALTH | Age: 67
End: 2022-03-30

## 2022-05-26 ENCOUNTER — TRANSCRIBE ORDERS (OUTPATIENT)
Dept: ADMINISTRATIVE | Facility: HOSPITAL | Age: 67
End: 2022-05-26

## 2022-05-26 DIAGNOSIS — Z12.31 VISIT FOR SCREENING MAMMOGRAM: Primary | ICD-10-CM

## 2022-06-08 DIAGNOSIS — I65.23 BILATERAL CAROTID ARTERY STENOSIS: Primary | ICD-10-CM

## 2022-06-27 ENCOUNTER — LAB (OUTPATIENT)
Dept: LAB | Facility: HOSPITAL | Age: 67
End: 2022-06-27

## 2022-06-27 LAB
ALBUMIN SERPL-MCNC: 4.2 G/DL (ref 3.5–5.2)
ALBUMIN/GLOB SERPL: 1.6 G/DL
ALP SERPL-CCNC: 89 U/L (ref 39–117)
ALT SERPL W P-5'-P-CCNC: 13 U/L (ref 1–33)
ANION GAP SERPL CALCULATED.3IONS-SCNC: 14.3 MMOL/L (ref 5–15)
AST SERPL-CCNC: 15 U/L (ref 1–32)
BILIRUB SERPL-MCNC: 0.3 MG/DL (ref 0–1.2)
BUN SERPL-MCNC: 16 MG/DL (ref 8–23)
BUN/CREAT SERPL: 18.6 (ref 7–25)
CALCIUM SPEC-SCNC: 10.2 MG/DL (ref 8.6–10.5)
CHLORIDE SERPL-SCNC: 107 MMOL/L (ref 98–107)
CHOLEST SERPL-MCNC: 212 MG/DL (ref 0–200)
CO2 SERPL-SCNC: 23.7 MMOL/L (ref 22–29)
CREAT SERPL-MCNC: 0.86 MG/DL (ref 0.57–1)
EGFRCR SERPLBLD CKD-EPI 2021: 73.7 ML/MIN/1.73
GLOBULIN UR ELPH-MCNC: 2.7 GM/DL
GLUCOSE SERPL-MCNC: 105 MG/DL (ref 65–99)
HDLC SERPL-MCNC: 64 MG/DL (ref 40–60)
LDLC SERPL CALC-MCNC: 134 MG/DL (ref 0–100)
LDLC/HDLC SERPL: 2.07 {RATIO}
POTASSIUM SERPL-SCNC: 4.5 MMOL/L (ref 3.5–5.2)
PROT SERPL-MCNC: 6.9 G/DL (ref 6–8.5)
SODIUM SERPL-SCNC: 145 MMOL/L (ref 136–145)
TRIGL SERPL-MCNC: 77 MG/DL (ref 0–150)
VLDLC SERPL-MCNC: 14 MG/DL (ref 5–40)

## 2022-06-27 PROCEDURE — 80053 COMPREHEN METABOLIC PANEL: CPT | Performed by: INTERNAL MEDICINE

## 2022-06-27 PROCEDURE — 80061 LIPID PANEL: CPT | Performed by: INTERNAL MEDICINE

## 2022-06-27 PROCEDURE — 36415 COLL VENOUS BLD VENIPUNCTURE: CPT | Performed by: INTERNAL MEDICINE

## 2022-06-29 NOTE — PROGRESS NOTES
[facility] Cardiology    Patient ID: Coleen Kohler is a 67 y.o. female.  : 1955   Contact: [phone]    Encounter date: 2022    PCP: Provider, No Known      Chief complaint:   Chief Complaint   Patient presents with   • RBBB       Problem List:  1. Right Bundle Branch Block  a. Diagnosis 2017.  2. Chest pain:   a. CXR negative, 2020.  b. Attempted PET stress test, 2020. However, no IV could be obtained despite multiple tries, and in the end patient asked to go home and f/up with [facility] outpatient Troponins were negative x2 and ECG showed no acute ischemia/infarct.  3. Migraine HAs  4. Stroke-like symptoms:  a. Patient presented to [facility] ED on 2020 with dizziness and left-sided weakness.   b. MRI negative, 2020  c. Carotid duplex, 2020: Bilateral ICA stenosis of 50-69%.       Allergies   Allergen Reactions   • Atorvastatin Nausea Only     And indigestion       Current Medications:    Current Outpatient Medications:   •  Aspirin Buf,CaCarb-MgCarb-MgO, 81 MG tablet, aspirin 81 mg tablet  Take 1 tablet every day by oral route., Disp: , Rfl:   •  Rimegepant Sulfate (Nurtec) 75 MG tablet dispersible tablet, Take 1 tablet by mouth Daily As Needed., Disp: 8 tablet, Rfl: 11  •  vitamin B-6 (PYRIDOXINE) 50 MG tablet, Take 100 mg by mouth Daily., Disp: , Rfl:   •  vitamin C (ASCORBIC ACID) 500 MG tablet, Take 500 mg by mouth Daily., Disp: , Rfl:   •  vitamin D3 125 MCG (5000 UT) capsule capsule, Take 5,000 Units by mouth Daily., Disp: , Rfl:   •  Zinc Sulfate (ZINC 15 PO), zinc, Disp: , Rfl:   •  [START ON 2022] rosuvastatin (CRESTOR) 20 MG tablet, Take 1 tablet by mouth 3 (Three) Times a Week., Disp: 36 tablet, Rfl: 1    HPI    Coleen Kohler is a 67 y.o. female who presents today for an annual follow up of right bundle branch block, bilateral carotid artery stenosis, and cardiac risk factors. Since last visit, patient has a new complaint of a  "fluttering sensation that has happened over the course of the last 6 weeks. She states this does last longer than a few seconds.     Patient did have negative side effects of nausea and indigestion caused by the atorvastatin.  She has not been taking any cholesterol medication.  Patient maintains a stable activity level by riding a stationary bike for at least 30 minutes a day. She rides every day and only gives herself a break once a month. She even can read and stitch while riding. Patient denies chest pain, shortness of breath, orthopnea, edema, dizziness, and syncope.     The following portions of the patient's history were reviewed and updated as appropriate: allergies, current medications and problem list.    Pertinent positives as listed in the HPI.  All other systems reviewed are negative.         Vitals:    06/30/22 0912   BP: 128/80   BP Location: Left arm   Patient Position: Sitting   Pulse: 82   SpO2: 98%   Weight: 72.6 kg (160 lb)   Height: 162.6 cm (64\")       Physical Exam:  General: Alert and oriented.  Neck: Jugular venous pressure is within normal limits. Carotids have normal upstrokes without bruits.   Cardiovascular: Heart has a nondisplaced focal PMI. Regular rate and rhythm. No murmur, gallop or rub.  Lungs: Clear, no rales or wheezes. Equal expansion is noted.   Extremities: Show no edema.  Skin: Warm and dry.  Neurologic: Nonfocal.     Diagnostic Data (reviewed with patient):  Lab Results   Component Value Date    GLUCOSE 105 (H) 06/27/2022    BUN 16 06/27/2022    CREATININE 0.86 06/27/2022    BCR 18.6 06/27/2022     06/27/2022    K 4.5 06/27/2022     06/27/2022    CO2 23.7 06/27/2022    CALCIUM 10.2 06/27/2022    ALBUMIN 4.20 06/27/2022    ALKPHOS 89 06/27/2022    AST 15 06/27/2022    ALT 13 06/27/2022     Lab Results   Component Value Date    CHOL 212 (H) 06/27/2022    TRIG 77 06/27/2022    HDL 64 (H) 06/27/2022     (H) 06/27/2022           Procedures      Assessment:    " ICD-10-CM ICD-9-CM   1. Right bundle branch block  I45.10 426.4   2. Bilateral carotid artery stenosis  I65.23 433.10     433.30   3. Hyperlipidemia LDL goal <70  E78.5 272.4   4. Palpitations  R00.2 785.1     Other cholesterol medication options was discussed as an alternate therapy due to side effects of nausea and indigestion from atorvastatin.        Plan:  1. 7 day monitor to be placed on patient to further evaluate new symptom of heart fluttering and we will call with results.   2. Initiate rosuvastatin 20 mg, 3 days a week with dinner as an alternate therapy for atorvastatin.  If this is not helpful we will consider Zetia.  3. Repeat carotid duplex for follow-up of known carotid disease.    4. FLP and CMP ordered to be done to reassess lipid levels since new cholesterol medication was added.   5. Continue on aspirin 81 mg for antiplatelet therapy.   6. Continue all other current medications.  7. F/up in 6 months, sooner if needed.     Scribed for Maria Eugenia Milan MD by Laura Collado. 6/30/2022 09:39 EDT         I Maria Eugenia Milan MD personally performed the services described in this documentation as scribed by the above individual in my presence, and it is both accurate and complete.    Maria Eugenia Milan MD, FACC

## 2022-06-30 ENCOUNTER — OFFICE VISIT (OUTPATIENT)
Dept: CARDIOLOGY | Facility: CLINIC | Age: 67
End: 2022-06-30

## 2022-06-30 VITALS
DIASTOLIC BLOOD PRESSURE: 80 MMHG | HEART RATE: 82 BPM | HEIGHT: 64 IN | WEIGHT: 160 LBS | SYSTOLIC BLOOD PRESSURE: 128 MMHG | OXYGEN SATURATION: 98 % | BODY MASS INDEX: 27.31 KG/M2

## 2022-06-30 DIAGNOSIS — E78.5 HYPERLIPIDEMIA LDL GOAL <70: ICD-10-CM

## 2022-06-30 DIAGNOSIS — I65.23 BILATERAL CAROTID ARTERY STENOSIS: ICD-10-CM

## 2022-06-30 DIAGNOSIS — I45.10 RIGHT BUNDLE BRANCH BLOCK: Primary | ICD-10-CM

## 2022-06-30 DIAGNOSIS — R00.2 PALPITATIONS: ICD-10-CM

## 2022-06-30 PROCEDURE — 99214 OFFICE O/P EST MOD 30 MIN: CPT | Performed by: INTERNAL MEDICINE

## 2022-06-30 RX ORDER — ROSUVASTATIN CALCIUM 20 MG/1
20 TABLET, COATED ORAL 3 TIMES WEEKLY
Qty: 36 TABLET | Refills: 1 | Status: SHIPPED | OUTPATIENT
Start: 2022-07-01

## 2022-07-21 ENCOUNTER — HOSPITAL ENCOUNTER (OUTPATIENT)
Dept: MAMMOGRAPHY | Facility: HOSPITAL | Age: 67
Discharge: HOME | End: 2022-07-21
Admitting: OBSTETRICS & GYNECOLOGY

## 2022-07-21 DIAGNOSIS — Z12.31 VISIT FOR SCREENING MAMMOGRAM: ICD-10-CM

## 2022-07-21 PROCEDURE — 77063 BREAST TOMOSYNTHESIS BI: CPT | Performed by: RADIOLOGY

## 2022-07-21 PROCEDURE — 77067 SCR MAMMO BI INCL CAD: CPT | Performed by: RADIOLOGY

## 2022-07-21 PROCEDURE — 77063 BREAST TOMOSYNTHESIS BI: CPT

## 2022-07-21 PROCEDURE — 77067 SCR MAMMO BI INCL CAD: CPT

## 2022-07-25 ENCOUNTER — APPOINTMENT (OUTPATIENT)
Dept: CARDIOLOGY | Facility: HOSPITAL | Age: 67
End: 2022-07-25

## 2022-07-26 ENCOUNTER — HOSPITAL ENCOUNTER (OUTPATIENT)
Dept: CARDIOLOGY | Facility: HOSPITAL | Age: 67
Discharge: HOME | End: 2022-07-26
Admitting: NURSE PRACTITIONER

## 2022-07-26 VITALS — HEIGHT: 64 IN | BODY MASS INDEX: 27.1 KG/M2 | WEIGHT: 158.73 LBS

## 2022-07-26 DIAGNOSIS — I65.23 BILATERAL CAROTID ARTERY STENOSIS: ICD-10-CM

## 2022-07-26 LAB
BH CV XLRA MEAS LEFT DIST CCA EDV: 22.3 CM/SEC
BH CV XLRA MEAS LEFT DIST CCA PSV: 77.5 CM/SEC
BH CV XLRA MEAS LEFT DIST ICA EDV: 38.4 CM/SEC
BH CV XLRA MEAS LEFT DIST ICA PSV: 141 CM/SEC
BH CV XLRA MEAS LEFT ICA/CCA RATIO: 1.17
BH CV XLRA MEAS LEFT MID CCA EDV: 19.6 CM/SEC
BH CV XLRA MEAS LEFT MID CCA PSV: 71.9 CM/SEC
BH CV XLRA MEAS LEFT MID ICA EDV: 21.3 CM/SEC
BH CV XLRA MEAS LEFT MID ICA PSV: 62.5 CM/SEC
BH CV XLRA MEAS LEFT PROX CCA EDV: 22.3 CM/SEC
BH CV XLRA MEAS LEFT PROX CCA PSV: 103 CM/SEC
BH CV XLRA MEAS LEFT PROX ECA EDV: 15.4 CM/SEC
BH CV XLRA MEAS LEFT PROX ECA PSV: 64.2 CM/SEC
BH CV XLRA MEAS LEFT PROX ICA EDV: 29.3 CM/SEC
BH CV XLRA MEAS LEFT PROX ICA PSV: 83.1 CM/SEC
BH CV XLRA MEAS LEFT PROX SCLA PSV: 92.6 CM/SEC
BH CV XLRA MEAS LEFT VERTEBRAL A EDV: 13.3 CM/SEC
BH CV XLRA MEAS LEFT VERTEBRAL A PSV: 53.1 CM/SEC
BH CV XLRA MEAS RIGHT DIST CCA EDV: 25.1 CM/SEC
BH CV XLRA MEAS RIGHT DIST CCA PSV: 83.3 CM/SEC
BH CV XLRA MEAS RIGHT DIST ICA EDV: 33.5 CM/SEC
BH CV XLRA MEAS RIGHT DIST ICA PSV: 83.1 CM/SEC
BH CV XLRA MEAS RIGHT ICA/CCA RATIO: 1.46
BH CV XLRA MEAS RIGHT MID CCA EDV: 15.7 CM/SEC
BH CV XLRA MEAS RIGHT MID CCA PSV: 68.4 CM/SEC
BH CV XLRA MEAS RIGHT MID ICA EDV: 33.8 CM/SEC
BH CV XLRA MEAS RIGHT MID ICA PSV: 99.8 CM/SEC
BH CV XLRA MEAS RIGHT PROX CCA EDV: 18.9 CM/SEC
BH CV XLRA MEAS RIGHT PROX CCA PSV: 118 CM/SEC
BH CV XLRA MEAS RIGHT PROX ECA EDV: 15.4 CM/SEC
BH CV XLRA MEAS RIGHT PROX ECA PSV: 85.9 CM/SEC
BH CV XLRA MEAS RIGHT PROX ICA EDV: 34.6 CM/SEC
BH CV XLRA MEAS RIGHT PROX ICA PSV: 79.4 CM/SEC
BH CV XLRA MEAS RIGHT PROX SCLA PSV: 101 CM/SEC
BH CV XLRA MEAS RIGHT VERTEBRAL A EDV: 26.5 CM/SEC
BH CV XLRA MEAS RIGHT VERTEBRAL A PSV: 85.2 CM/SEC
LEFT ARM BP: NORMAL MMHG
MAXIMAL PREDICTED HEART RATE: 152 BPM
RIGHT ARM BP: NORMAL MMHG
STRESS TARGET HR: 129 BPM

## 2022-07-26 PROCEDURE — 93880 EXTRACRANIAL BILAT STUDY: CPT

## 2022-07-26 PROCEDURE — 93880 EXTRACRANIAL BILAT STUDY: CPT | Performed by: INTERNAL MEDICINE

## 2023-05-02 DIAGNOSIS — R00.2 PALPITATIONS: Primary | ICD-10-CM

## 2023-06-01 DIAGNOSIS — R94.31 ABNORMAL HOLTER MONITOR FINDING: ICD-10-CM

## 2023-06-01 DIAGNOSIS — I45.10 RIGHT BUNDLE BRANCH BLOCK: Primary | ICD-10-CM

## 2023-06-01 DIAGNOSIS — R00.2 PALPITATIONS: ICD-10-CM

## 2023-08-23 ENCOUNTER — HOSPITAL ENCOUNTER (OUTPATIENT)
Dept: MAMMOGRAPHY | Facility: HOSPITAL | Age: 68
Discharge: HOME | End: 2023-08-23
Admitting: OBSTETRICS & GYNECOLOGY
Payer: MEDICARE

## 2023-08-23 DIAGNOSIS — Z12.31 VISIT FOR SCREENING MAMMOGRAM: ICD-10-CM

## 2023-08-23 PROCEDURE — 77067 SCR MAMMO BI INCL CAD: CPT

## 2023-08-23 PROCEDURE — 77063 BREAST TOMOSYNTHESIS BI: CPT

## 2024-05-23 ENCOUNTER — TELEPHONE (OUTPATIENT)
Dept: CARDIOLOGY | Facility: CLINIC | Age: 69
End: 2024-05-23

## 2024-05-23 NOTE — TELEPHONE ENCOUNTER
"    Caller: Coleen Kohler \"Katerina\"    Relationship to patient: Self    Best call back number: [phone]    Chief complaint: PATIENT WILL NOT BE ABLE TO MAKE HER APPOINTMENT HUB HAS NO AVAILABILITY. PLEASE REACH OUT TO SCHEDULE.    Type of visit: FOLLOW UP    Requested date: ASAP     If rescheduling, when is the original appointment: 05.30.24     Additional notes:PATIENT HAS NO NEW OR WORSENING CONCERNS.  "

## 2024-07-23 NOTE — PROGRESS NOTES
[facility] Cardiology    Date: 2024    Patient ID: Coleen Kohler is a 69 y.o. female   : 1955   Contact: [phone]         Chief Complaint:    Chief Complaint   Patient presents with    Palpitations    Right bundle branch block       Problem List:  Bilateral carotid artery stenosis  Carotid duplex, 2020: Bilateral ICA stenosis of 50-69%.   Carotid duplex, 2022: Bilateral ICA 0-49%. Mild intimal medial thickening is noted bilaterally. No plaque is seen. The vessels are somewhat tortuous distally.   Frequent PVCs  1w Holter, 2022: SR/SB/ST. Occasional PAC's/rare PVC's. Brief SVT.   Holter, 2023: SB/SR/ST. Frequent PVC's (479 PVC's in 7 days). Brief NSVT 5 beats.   Stress echo, 2023: EF 65%. Trace to mild MR with trace TR. Appropriate suppression of PVCs with exercise. Low risk.   Right Bundle Branch Block  Diagnosis .  Chest pain:   CXR negative, 2020.  Attempted PET stress test, 2020. However, no IV could be obtained despite multiple tries, and in the end patient asked to go home and f/up with [facility] outpatient Troponins were negative x2 and ECG showed no acute ischemia/infarct.  Hyperlipidemia  Migraine  Stroke-like symptoms:  [facility] ED Presentation, 2020: Dizziness and left-sided weakness.   MRI negative, 2020        Allergies   Allergen Reactions    Atorvastatin Nausea Only     And indigestion         Current Outpatient Medications:     Aspirin Buf,CaCarb-MgCarb-MgO, 81 MG tablet, aspirin 81 mg tablet  Take 1 tablet every day by oral route., Disp: , Rfl:     bisoprolol (ZEBeta) 5 MG tablet, Take 1 tablet by mouth Daily., Disp: 90 tablet, Rfl: 3    Rimegepant Sulfate (Nurtec) 75 MG tablet dispersible tablet, Take 1 tablet by mouth Daily As Needed., Disp: 8 tablet, Rfl: 11    vitamin B-6 (PYRIDOXINE) 50 MG tablet, Take 2 tablets by mouth Daily., Disp: , Rfl:     vitamin C (ASCORBIC ACID) 500 MG tablet, Take 1  "tablet by mouth Daily., Disp: , Rfl:     vitamin D3 125 MCG (5000 UT) capsule capsule, Take 1 capsule by mouth Daily., Disp: , Rfl:     Zinc Sulfate (ZINC 15 PO), zinc, Disp: , Rfl:      History of Present Illness: Coleen Kohler is a 69 y.o. female who is here today for annual follow-up for carotid stenosis, right bundle angel block, palpitations. Since last visit patient thought she may be allergic to bisoprolol but it turns out patient was having a reaction to niacin.  Patient has been taking the bisoprolol which has controlled her palpitations and no longer is taking the verapamil.  Patient tells me she does not currently have a primary care provider and has not had recent blood work.  I inquired about having routine blood work including cholesterol panel.  Patient states that she feels as if that would be reasonable but has no desire to be on medication such as statins to help with her cholesterol at this time.  She remains fairly active and has no exertional chest pain or shortness of breath.    The following portions of the patient's history were reviewed and updated as appropriate: allergies, current medications and problem list.     Pertinent positives as listed in the HPI.  All other systems reviewed are negative.            Vitals:    07/24/24 0842   BP: 124/82   BP Location: Left arm   Patient Position: Sitting   Cuff Size: Adult   Pulse: 67   SpO2: 97%   Weight: 72.6 kg (160 lb)   Height: 162.6 cm (64\")     Body mass index is 27.46 kg/m².    Physical Exam:  General: Alert and oriented.  Neck: Jugular venous pressure is within normal limits. Carotids have normal upstrokes without bruits.   Cardiovascular: Regular rate and rhythm. No murmur, gallop or rub.  Lungs: Clear, no rales or wheezes. Equal expansion is noted.   Extremities: No peripheral edema  Skin: Warm and dry.  Neurologic: Nonfocal.     Diagnostic data (reviewed with patient):  CMP:   Lab Results   Component Value Date    GLUCOSE 105 (H) " 06/27/2022    BUN 16 06/27/2022    CREATININE 0.86 06/27/2022    EGFRIFNONA 63 06/11/2020    BCR 18.6 06/27/2022    K 4.5 06/27/2022    CO2 23.7 06/27/2022    CALCIUM 10.2 06/27/2022    ALBUMIN 4.20 06/27/2022    AST 15 06/27/2022    ALT 13 06/27/2022       CBC:    Lab Results   Component Value Date    WBC 7.69 05/27/2020    HGB 13.0 05/27/2020    HCT 41.3 05/27/2020    MCV 92.2 05/27/2020     05/27/2020       LIPIDS:    Lab Results   Component Value Date    CHOL 212 (H) 06/27/2022    TRIG 77 06/27/2022    HDL 64 (H) 06/27/2022     (H) 06/27/2022       HgbA1c:    Lab Results   Component Value Date    HGBA1C 6.00 (H) 06/11/2020       ECG 12 Lead    Date/Time: 7/24/2024 10:43 AM  Performed by: Melissa Andrea PA-C    Authorized by: Melissa Andrea PA-C  Comparison: compared with previous ECG from 6/30/2021  Similar to previous ECG  Rhythm: sinus rhythm  BPM: 67  Conduction: right bundle branch block    Clinical impression: non-specific ECG           Advance Care Planning   ACP discussion was declined by the patient. Patient has an advance directive (not in EMR), copy requested.            Assessment:  1. Palpitations    2. Right bundle branch block    3. Dyslipidemia             Plan:  Continue bisoprolol 5 mg daily for management of the patient's palpitations.  Discussed routine blood work and patient is agreeable to have this done so I have ordered CBC, CMP and thyroid function.  I have not ordered a cholesterol panel.  I discussed cardiovascular risk with the patient and how elevated cholesterol plays into that.  At this time patient is not interested in treatment of this.  Continue aspirin 81 mg daily for antiplatelet therapy.  F/up in 12 months, sooner if needed.      Melissa Andrea PA-C

## 2024-07-24 ENCOUNTER — OFFICE VISIT (OUTPATIENT)
Dept: CARDIOLOGY | Facility: CLINIC | Age: 69
End: 2024-07-24
Payer: MEDICARE

## 2024-07-24 VITALS
HEART RATE: 67 BPM | WEIGHT: 160 LBS | SYSTOLIC BLOOD PRESSURE: 124 MMHG | HEIGHT: 64 IN | DIASTOLIC BLOOD PRESSURE: 82 MMHG | BODY MASS INDEX: 27.31 KG/M2 | OXYGEN SATURATION: 97 %

## 2024-07-24 DIAGNOSIS — I45.10 RIGHT BUNDLE BRANCH BLOCK: ICD-10-CM

## 2024-07-24 DIAGNOSIS — E78.5 DYSLIPIDEMIA: ICD-10-CM

## 2024-07-24 DIAGNOSIS — R00.2 PALPITATIONS: Primary | ICD-10-CM

## 2024-07-24 RX ORDER — BISOPROLOL FUMARATE 5 MG/1
5 TABLET, FILM COATED ORAL DAILY
Qty: 90 TABLET | Refills: 3 | Status: SHIPPED | OUTPATIENT
Start: 2024-07-24

## 2024-07-24 RX ORDER — BISOPROLOL FUMARATE 5 MG/1
1 TABLET, FILM COATED ORAL DAILY
COMMUNITY
Start: 2024-05-23 | End: 2024-07-24 | Stop reason: SDUPTHER

## 2024-08-09 ENCOUNTER — LAB (OUTPATIENT)
Dept: LAB | Facility: HOSPITAL | Age: 69
End: 2024-08-09
Payer: MEDICARE

## 2024-08-09 DIAGNOSIS — R00.2 PALPITATIONS: ICD-10-CM

## 2024-08-09 LAB
ALBUMIN SERPL-MCNC: 3.8 G/DL (ref 3.5–5.2)
ALBUMIN/GLOB SERPL: 1.4 G/DL
ALP SERPL-CCNC: 75 U/L (ref 39–117)
ALT SERPL W P-5'-P-CCNC: 15 U/L (ref 1–33)
ANION GAP SERPL CALCULATED.3IONS-SCNC: 10.7 MMOL/L (ref 5–15)
AST SERPL-CCNC: 17 U/L (ref 1–32)
BILIRUB SERPL-MCNC: 0.3 MG/DL (ref 0–1.2)
BUN SERPL-MCNC: 13 MG/DL (ref 8–23)
BUN/CREAT SERPL: 15.5 (ref 7–25)
CALCIUM SPEC-SCNC: 9.3 MG/DL (ref 8.6–10.5)
CHLORIDE SERPL-SCNC: 106 MMOL/L (ref 98–107)
CO2 SERPL-SCNC: 25.3 MMOL/L (ref 22–29)
CREAT SERPL-MCNC: 0.84 MG/DL (ref 0.57–1)
DEPRECATED RDW RBC AUTO: 42.5 FL (ref 37–54)
EGFRCR SERPLBLD CKD-EPI 2021: 74.9 ML/MIN/1.73
ERYTHROCYTE [DISTWIDTH] IN BLOOD BY AUTOMATED COUNT: 12.7 % (ref 12.3–15.4)
GLOBULIN UR ELPH-MCNC: 2.8 GM/DL
GLUCOSE SERPL-MCNC: 112 MG/DL (ref 65–99)
HCT VFR BLD AUTO: 40.5 % (ref 34–46.6)
HGB BLD-MCNC: 13.4 G/DL (ref 12–15.9)
MCH RBC QN AUTO: 30.5 PG (ref 26.6–33)
MCHC RBC AUTO-ENTMCNC: 33.1 G/DL (ref 31.5–35.7)
MCV RBC AUTO: 92 FL (ref 79–97)
PLATELET # BLD AUTO: 260 10*3/MM3 (ref 140–450)
PMV BLD AUTO: 8.8 FL (ref 6–12)
POTASSIUM SERPL-SCNC: 4 MMOL/L (ref 3.5–5.2)
PROT SERPL-MCNC: 6.6 G/DL (ref 6–8.5)
RBC # BLD AUTO: 4.4 10*6/MM3 (ref 3.77–5.28)
SODIUM SERPL-SCNC: 142 MMOL/L (ref 136–145)
T-UPTAKE NFR SERPL: 1.03 TBI (ref 0.8–1.3)
T4 SERPL-MCNC: 5.16 MCG/DL (ref 4.5–11.7)
TSH SERPL DL<=0.05 MIU/L-ACNC: 3.97 UIU/ML (ref 0.27–4.2)
WBC NRBC COR # BLD AUTO: 7.24 10*3/MM3 (ref 3.4–10.8)

## 2024-08-09 PROCEDURE — 84436 ASSAY OF TOTAL THYROXINE: CPT

## 2024-08-09 PROCEDURE — 84443 ASSAY THYROID STIM HORMONE: CPT

## 2024-08-09 PROCEDURE — 84479 ASSAY OF THYROID (T3 OR T4): CPT

## 2024-08-09 PROCEDURE — 36415 COLL VENOUS BLD VENIPUNCTURE: CPT

## 2024-08-09 PROCEDURE — 85027 COMPLETE CBC AUTOMATED: CPT

## 2024-08-09 PROCEDURE — 80053 COMPREHEN METABOLIC PANEL: CPT

## 2025-08-01 PROBLEM — R00.2 PALPITATIONS: Status: ACTIVE | Noted: 2025-08-01

## 2025-08-01 PROBLEM — E78.5 DYSLIPIDEMIA: Status: ACTIVE | Noted: 2025-08-01

## 2025-08-05 ENCOUNTER — TELEPHONE (OUTPATIENT)
Dept: CARDIOLOGY | Facility: CLINIC | Age: 70
End: 2025-08-05
Payer: MEDICARE

## 2025-08-06 ENCOUNTER — OFFICE VISIT (OUTPATIENT)
Dept: CARDIOLOGY | Facility: CLINIC | Age: 70
End: 2025-08-06
Payer: MEDICARE

## 2025-08-06 VITALS
DIASTOLIC BLOOD PRESSURE: 68 MMHG | SYSTOLIC BLOOD PRESSURE: 132 MMHG | HEIGHT: 65 IN | WEIGHT: 162.4 LBS | OXYGEN SATURATION: 98 % | BODY MASS INDEX: 27.06 KG/M2 | HEART RATE: 64 BPM

## 2025-08-06 DIAGNOSIS — I45.10 RIGHT BUNDLE BRANCH BLOCK: ICD-10-CM

## 2025-08-06 DIAGNOSIS — E78.5 DYSLIPIDEMIA: ICD-10-CM

## 2025-08-06 DIAGNOSIS — R00.2 PALPITATIONS: Primary | ICD-10-CM

## 2025-08-06 PROCEDURE — 99214 OFFICE O/P EST MOD 30 MIN: CPT | Performed by: INTERNAL MEDICINE

## 2025-08-06 PROCEDURE — 93000 ELECTROCARDIOGRAM COMPLETE: CPT | Performed by: INTERNAL MEDICINE

## 2025-08-08 ENCOUNTER — LAB (OUTPATIENT)
Dept: LAB | Facility: HOSPITAL | Age: 70
End: 2025-08-08
Payer: MEDICARE

## 2025-08-08 DIAGNOSIS — E78.5 DYSLIPIDEMIA: ICD-10-CM

## 2025-08-08 LAB
CHOLEST SERPL-MCNC: 212 MG/DL (ref 0–200)
HDLC SERPL-MCNC: 56 MG/DL (ref 40–60)
LDLC SERPL CALC-MCNC: 139 MG/DL (ref 0–100)
LDLC/HDLC SERPL: 2.44 {RATIO}
TRIGL SERPL-MCNC: 96 MG/DL (ref 0–150)
VLDLC SERPL-MCNC: 17 MG/DL (ref 5–40)

## 2025-08-08 PROCEDURE — 36415 COLL VENOUS BLD VENIPUNCTURE: CPT

## 2025-08-08 PROCEDURE — 80061 LIPID PANEL: CPT
